# Patient Record
Sex: FEMALE | HISPANIC OR LATINO
[De-identification: names, ages, dates, MRNs, and addresses within clinical notes are randomized per-mention and may not be internally consistent; named-entity substitution may affect disease eponyms.]

---

## 2017-04-06 ENCOUNTER — RX RENEWAL (OUTPATIENT)
Age: 52
End: 2017-04-06

## 2017-04-24 ENCOUNTER — APPOINTMENT (OUTPATIENT)
Dept: SURGERY | Facility: CLINIC | Age: 52
End: 2017-04-24

## 2017-04-24 VITALS
TEMPERATURE: 97.8 F | HEART RATE: 93 BPM | DIASTOLIC BLOOD PRESSURE: 69 MMHG | OXYGEN SATURATION: 99 % | HEIGHT: 66 IN | BODY MASS INDEX: 27.32 KG/M2 | WEIGHT: 170 LBS | SYSTOLIC BLOOD PRESSURE: 106 MMHG | RESPIRATION RATE: 16 BRPM

## 2017-04-27 ENCOUNTER — RX RENEWAL (OUTPATIENT)
Age: 52
End: 2017-04-27

## 2017-05-01 ENCOUNTER — APPOINTMENT (OUTPATIENT)
Dept: SURGERY | Facility: CLINIC | Age: 52
End: 2017-05-01

## 2017-05-01 VITALS
OXYGEN SATURATION: 100 % | HEART RATE: 96 BPM | SYSTOLIC BLOOD PRESSURE: 107 MMHG | DIASTOLIC BLOOD PRESSURE: 70 MMHG | RESPIRATION RATE: 16 BRPM | TEMPERATURE: 98.1 F

## 2017-05-30 ENCOUNTER — APPOINTMENT (OUTPATIENT)
Dept: ORTHOPEDIC SURGERY | Facility: CLINIC | Age: 52
End: 2017-05-30

## 2017-05-30 VITALS
BODY MASS INDEX: 28.12 KG/M2 | SYSTOLIC BLOOD PRESSURE: 115 MMHG | HEIGHT: 66 IN | HEART RATE: 65 BPM | DIASTOLIC BLOOD PRESSURE: 73 MMHG | WEIGHT: 175 LBS

## 2017-05-30 DIAGNOSIS — Z78.9 OTHER SPECIFIED HEALTH STATUS: ICD-10-CM

## 2017-05-31 PROBLEM — Z78.9 DOES NOT USE ILLICIT DRUGS: Status: ACTIVE | Noted: 2017-05-30

## 2017-05-31 PROBLEM — Z78.9 EXERCISES OCCASIONALLY: Status: ACTIVE | Noted: 2017-05-30

## 2017-06-05 ENCOUNTER — APPOINTMENT (OUTPATIENT)
Dept: RADIOLOGY | Facility: CLINIC | Age: 52
End: 2017-06-05

## 2017-06-05 ENCOUNTER — OUTPATIENT (OUTPATIENT)
Dept: OUTPATIENT SERVICES | Facility: HOSPITAL | Age: 52
LOS: 1 days | End: 2017-06-05
Payer: COMMERCIAL

## 2017-06-05 DIAGNOSIS — Z00.8 ENCOUNTER FOR OTHER GENERAL EXAMINATION: ICD-10-CM

## 2017-06-05 PROCEDURE — 74018 RADEX ABDOMEN 1 VIEW: CPT

## 2017-06-14 ENCOUNTER — APPOINTMENT (OUTPATIENT)
Dept: FAMILY MEDICINE | Facility: CLINIC | Age: 52
End: 2017-06-14

## 2017-06-14 VITALS
SYSTOLIC BLOOD PRESSURE: 105 MMHG | DIASTOLIC BLOOD PRESSURE: 68 MMHG | RESPIRATION RATE: 14 BRPM | HEART RATE: 80 BPM | BODY MASS INDEX: 28.12 KG/M2 | OXYGEN SATURATION: 98 % | WEIGHT: 175 LBS | HEIGHT: 66 IN

## 2017-06-26 ENCOUNTER — OUTPATIENT (OUTPATIENT)
Dept: OUTPATIENT SERVICES | Facility: HOSPITAL | Age: 52
LOS: 1 days | End: 2017-06-26
Payer: COMMERCIAL

## 2017-06-26 VITALS
SYSTOLIC BLOOD PRESSURE: 98 MMHG | OXYGEN SATURATION: 99 % | HEIGHT: 66 IN | RESPIRATION RATE: 18 BRPM | HEART RATE: 60 BPM | DIASTOLIC BLOOD PRESSURE: 70 MMHG | WEIGHT: 177.91 LBS | TEMPERATURE: 97 F

## 2017-06-26 DIAGNOSIS — Z98.890 OTHER SPECIFIED POSTPROCEDURAL STATES: Chronic | ICD-10-CM

## 2017-06-26 DIAGNOSIS — M65.311 TRIGGER THUMB, RIGHT THUMB: ICD-10-CM

## 2017-06-26 DIAGNOSIS — G56.00 CARPAL TUNNEL SYNDROME, UNSPECIFIED UPPER LIMB: ICD-10-CM

## 2017-06-26 DIAGNOSIS — M18.11 UNILATERAL PRIMARY OSTEOARTHRITIS OF FIRST CARPOMETACARPAL JOINT, RIGHT HAND: ICD-10-CM

## 2017-06-26 DIAGNOSIS — Z01.818 ENCOUNTER FOR OTHER PREPROCEDURAL EXAMINATION: ICD-10-CM

## 2017-06-26 DIAGNOSIS — G56.01 CARPAL TUNNEL SYNDROME, RIGHT UPPER LIMB: ICD-10-CM

## 2017-06-26 DIAGNOSIS — Z90.49 ACQUIRED ABSENCE OF OTHER SPECIFIED PARTS OF DIGESTIVE TRACT: Chronic | ICD-10-CM

## 2017-06-26 LAB
HCT VFR BLD CALC: 36.2 % — SIGNIFICANT CHANGE UP (ref 34.5–45)
HGB BLD-MCNC: 11.9 G/DL — SIGNIFICANT CHANGE UP (ref 11.5–15.5)
MCHC RBC-ENTMCNC: 28.7 PG — SIGNIFICANT CHANGE UP (ref 27–34)
MCHC RBC-ENTMCNC: 32.9 GM/DL — SIGNIFICANT CHANGE UP (ref 32–36)
MCV RBC AUTO: 87.4 FL — SIGNIFICANT CHANGE UP (ref 80–100)
PLATELET # BLD AUTO: 249 K/UL — SIGNIFICANT CHANGE UP (ref 150–400)
RBC # BLD: 4.14 M/UL — SIGNIFICANT CHANGE UP (ref 3.8–5.2)
RBC # FLD: 13.7 % — SIGNIFICANT CHANGE UP (ref 10.3–14.5)
WBC # BLD: 5.31 K/UL — SIGNIFICANT CHANGE UP (ref 3.8–10.5)
WBC # FLD AUTO: 5.31 K/UL — SIGNIFICANT CHANGE UP (ref 3.8–10.5)

## 2017-06-26 PROCEDURE — G0463: CPT

## 2017-06-26 PROCEDURE — 85027 COMPLETE CBC AUTOMATED: CPT

## 2017-06-26 RX ORDER — ACETAMINOPHEN 500 MG
975 TABLET ORAL ONCE
Qty: 0 | Refills: 0 | Status: COMPLETED | OUTPATIENT
Start: 2017-07-10 | End: 2017-07-10

## 2017-06-26 RX ORDER — SODIUM CHLORIDE 9 MG/ML
3 INJECTION INTRAMUSCULAR; INTRAVENOUS; SUBCUTANEOUS EVERY 8 HOURS
Qty: 0 | Refills: 0 | Status: DISCONTINUED | OUTPATIENT
Start: 2017-07-10 | End: 2017-07-25

## 2017-06-26 RX ORDER — LIDOCAINE HCL 20 MG/ML
0.2 VIAL (ML) INJECTION ONCE
Qty: 0 | Refills: 0 | Status: DISCONTINUED | OUTPATIENT
Start: 2017-07-10 | End: 2017-07-25

## 2017-06-26 RX ORDER — CELECOXIB 200 MG/1
200 CAPSULE ORAL ONCE
Qty: 0 | Refills: 0 | Status: COMPLETED | OUTPATIENT
Start: 2017-07-10 | End: 2017-07-10

## 2017-06-26 NOTE — H&P PST ADULT - PROBLEM SELECTOR PLAN 1
Right carpal tunnel release, cortisone injection right basal joint, injection right thumb flexor sheath

## 2017-06-26 NOTE — H&P PST ADULT - ATTENDING COMMENTS
The patient has symptomatic right carpal tunnel syndrome, with positive NCS/EMG. Patient was offered non operative treatment, and declined. Patient is insistent that she wants to have surgery for her right carpal tunnel syndrome. The patient also has spinal stenosis, and it is not clear if the stenosis may be causing some degree of her symptoms. Even so, the patient is unwilling to pursue other evaluations or treatments and requests right carpal tunnel surgery. Given these factors and that the patient provides history that she has had virtually constant numbness in the index, long and ring fingers for three years, the patient is indicated for surgery. Because of the duration of numbness, the prognosis is limited.    Unrelated to the right carpal tunnel syndrome, the patient had left carpal tunnel release surgery. She has marked tenderness over the palmar cutaneous branch of median nerve in the distal forearm and palm. 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    Surgery for right carpal tunnel syndrome is indicated because of chronic symptoms refractory to conservative treatment, interfering with sleep, and activities of daily living. Because of the duration and severity of carpal tunnel syndrome, ongoing symptoms can be expected postoperatively. While the patient may see improvement, there is no assurance of this. The possibility of little improvement or of no improvement exists. Even though it is low, the possibility of worsening exists as well. A lengthy and detailed discussion has been held with the patient. The surgical plan, alternative treatments, and the associated risks, complications, limitations, and expectations have been discussed with the patient. Postoperative plan, need for exercising to regain motion and function, wound care, dressing care, activities, follow up, and possible need for hand therapy have been reviewed and discussed. In addition, the expectation of postop wound related pain, wound induration, swelling, weakness of , alteration of hand and finger use and function, and palmar and forearm tenderness were reviewed. In particular, the expectation of weakness, difficulty with daily activities, and wound induration often lasting six months have been stressed. All questions have been answered. The patient has expressed understanding and acceptance of the above, and has consented to surgery.

## 2017-06-26 NOTE — H&P PST ADULT - HISTORY OF PRESENT ILLNESS
52 y/o F PMH anemia due to hemorrhoids, c/o right hand numbness for the past 3 years and pain in the right thumb.  Presents today for right carpal tunnel release, cortisone injection right basal joint, injection right thumb flexor sheath.

## 2017-06-26 NOTE — H&P PST ADULT - PMH
Carpal tunnel syndrome    H. pylori infection    Hemorrhoids, internal    Hypothyroid    Rheumatoid arthritis

## 2017-06-27 ENCOUNTER — RX RENEWAL (OUTPATIENT)
Age: 52
End: 2017-06-27

## 2017-07-03 ENCOUNTER — APPOINTMENT (OUTPATIENT)
Dept: FAMILY MEDICINE | Facility: CLINIC | Age: 52
End: 2017-07-03

## 2017-07-03 VITALS
WEIGHT: 175 LBS | RESPIRATION RATE: 14 BRPM | BODY MASS INDEX: 28.12 KG/M2 | HEART RATE: 69 BPM | HEIGHT: 66 IN | SYSTOLIC BLOOD PRESSURE: 101 MMHG | DIASTOLIC BLOOD PRESSURE: 66 MMHG | OXYGEN SATURATION: 99 %

## 2017-07-03 VITALS — TEMPERATURE: 97.6 F

## 2017-07-03 DIAGNOSIS — Z11.1 ENCOUNTER FOR SCREENING FOR RESPIRATORY TUBERCULOSIS: ICD-10-CM

## 2017-07-03 DIAGNOSIS — E22.9 HYPERFUNCTION OF PITUITARY GLAND, UNSPECIFIED: ICD-10-CM

## 2017-07-03 DIAGNOSIS — Z86.19 PERSONAL HISTORY OF OTHER INFECTIOUS AND PARASITIC DISEASES: ICD-10-CM

## 2017-07-03 DIAGNOSIS — Z87.19 PERSONAL HISTORY OF OTHER DISEASES OF THE DIGESTIVE SYSTEM: ICD-10-CM

## 2017-07-03 LAB
25(OH)D3 SERPL-MCNC: 33 NG/ML
ALBUMIN SERPL ELPH-MCNC: 4.5 G/DL
ALP BLD-CCNC: 91 U/L
ALT SERPL-CCNC: 24 U/L
ANION GAP SERPL CALC-SCNC: 17 MMOL/L
AST SERPL-CCNC: 24 U/L
BASOPHILS # BLD AUTO: 0.01 K/UL
BASOPHILS NFR BLD AUTO: 0.2 %
BILIRUB SERPL-MCNC: 0.2 MG/DL
BUN SERPL-MCNC: 15 MG/DL
CALCIUM SERPL-MCNC: 10.1 MG/DL
CHLORIDE SERPL-SCNC: 103 MMOL/L
CHOLEST SERPL-MCNC: 256 MG/DL
CHOLEST/HDLC SERPL: 4.9 RATIO
CO2 SERPL-SCNC: 22 MMOL/L
CREAT SERPL-MCNC: 0.75 MG/DL
EOSINOPHIL # BLD AUTO: 0.17 K/UL
EOSINOPHIL NFR BLD AUTO: 3.3 %
GLUCOSE SERPL-MCNC: 93 MG/DL
HBA1C MFR BLD HPLC: 5.7 %
HCT VFR BLD CALC: 38.8 %
HDLC SERPL-MCNC: 52 MG/DL
HGB BLD-MCNC: 12.9 G/DL
IMM GRANULOCYTES NFR BLD AUTO: 0.2 %
IRON SATN MFR SERPL: 30 %
IRON SERPL-MCNC: 111 UG/DL
LDLC SERPL CALC-MCNC: 166 MG/DL
LYMPHOCYTES # BLD AUTO: 2.53 K/UL
LYMPHOCYTES NFR BLD AUTO: 48.5 %
MAN DIFF?: NORMAL
MCHC RBC-ENTMCNC: 29 PG
MCHC RBC-ENTMCNC: 33.2 GM/DL
MCV RBC AUTO: 87.2 FL
MONOCYTES # BLD AUTO: 0.25 K/UL
MONOCYTES NFR BLD AUTO: 4.8 %
NEUTROPHILS # BLD AUTO: 2.25 K/UL
NEUTROPHILS NFR BLD AUTO: 43 %
PLATELET # BLD AUTO: 232 K/UL
POTASSIUM SERPL-SCNC: 4.1 MMOL/L
PROT SERPL-MCNC: 7.8 G/DL
RBC # BLD: 4.45 M/UL
RBC # FLD: 13.7 %
SODIUM SERPL-SCNC: 142 MMOL/L
T4 FREE SERPL-MCNC: 1.1 NG/DL
TIBC SERPL-MCNC: 371 UG/DL
TRIGL SERPL-MCNC: 190 MG/DL
TSH SERPL-ACNC: 2.65 UIU/ML
UIBC SERPL-MCNC: 260 UG/DL
WBC # FLD AUTO: 5.22 K/UL

## 2017-07-03 RX ORDER — HYDROCORTISONE ACETATE 30 MG/1
30 SUPPOSITORY RECTAL
Qty: 28 | Refills: 0 | Status: COMPLETED | COMMUNITY
Start: 2017-04-07

## 2017-07-03 RX ORDER — AMOXICILLIN 500 MG/1
500 CAPSULE ORAL
Refills: 0 | Status: COMPLETED | COMMUNITY
Start: 2017-06-14 | End: 2017-07-03

## 2017-07-03 RX ORDER — OMEPRAZOLE, CLARITHROMYCIN, AMOXICILLIN 20(20)-500
500-500-20 KIT ORAL
Qty: 80 | Refills: 0 | Status: COMPLETED | COMMUNITY
Start: 2017-06-09

## 2017-07-03 RX ORDER — CLARITHROMYCIN 500 MG/1
500 TABLET, FILM COATED ORAL
Refills: 0 | Status: COMPLETED | COMMUNITY
Start: 2017-06-14 | End: 2017-07-03

## 2017-07-10 ENCOUNTER — APPOINTMENT (OUTPATIENT)
Dept: ORTHOPEDIC SURGERY | Facility: HOSPITAL | Age: 52
End: 2017-07-10

## 2017-07-10 ENCOUNTER — OUTPATIENT (OUTPATIENT)
Dept: OUTPATIENT SERVICES | Facility: HOSPITAL | Age: 52
LOS: 1 days | End: 2017-07-10
Payer: COMMERCIAL

## 2017-07-10 ENCOUNTER — TRANSCRIPTION ENCOUNTER (OUTPATIENT)
Age: 52
End: 2017-07-10

## 2017-07-10 VITALS
SYSTOLIC BLOOD PRESSURE: 112 MMHG | TEMPERATURE: 98 F | DIASTOLIC BLOOD PRESSURE: 68 MMHG | OXYGEN SATURATION: 98 % | HEART RATE: 64 BPM | RESPIRATION RATE: 16 BRPM

## 2017-07-10 VITALS
TEMPERATURE: 97 F | OXYGEN SATURATION: 99 % | HEIGHT: 66 IN | HEART RATE: 60 BPM | RESPIRATION RATE: 18 BRPM | DIASTOLIC BLOOD PRESSURE: 70 MMHG | SYSTOLIC BLOOD PRESSURE: 102 MMHG | WEIGHT: 177.91 LBS

## 2017-07-10 DIAGNOSIS — M18.11 UNILATERAL PRIMARY OSTEOARTHRITIS OF FIRST CARPOMETACARPAL JOINT, RIGHT HAND: ICD-10-CM

## 2017-07-10 DIAGNOSIS — M65.311 TRIGGER THUMB, RIGHT THUMB: ICD-10-CM

## 2017-07-10 DIAGNOSIS — Z98.890 OTHER SPECIFIED POSTPROCEDURAL STATES: Chronic | ICD-10-CM

## 2017-07-10 DIAGNOSIS — G56.01 CARPAL TUNNEL SYNDROME, RIGHT UPPER LIMB: ICD-10-CM

## 2017-07-10 DIAGNOSIS — Z90.49 ACQUIRED ABSENCE OF OTHER SPECIFIED PARTS OF DIGESTIVE TRACT: Chronic | ICD-10-CM

## 2017-07-10 PROCEDURE — 20600 DRAIN/INJ JOINT/BURSA W/O US: CPT | Mod: F5

## 2017-07-10 PROCEDURE — 64721 CARPAL TUNNEL SURGERY: CPT | Mod: RT

## 2017-07-10 PROCEDURE — 20550 NJX 1 TENDON SHEATH/LIGAMENT: CPT | Mod: F5,XS

## 2017-07-10 PROCEDURE — 20550 NJX 1 TENDON SHEATH/LIGAMENT: CPT | Mod: 59,F5

## 2017-07-10 PROCEDURE — 20600 DRAIN/INJ JOINT/BURSA W/O US: CPT | Mod: 59,F5

## 2017-07-10 RX ORDER — SODIUM CHLORIDE 9 MG/ML
1000 INJECTION, SOLUTION INTRAVENOUS
Qty: 0 | Refills: 0 | Status: DISCONTINUED | OUTPATIENT
Start: 2017-07-10 | End: 2017-07-25

## 2017-07-10 RX ORDER — CEFAZOLIN SODIUM 1 G
2000 VIAL (EA) INJECTION ONCE
Qty: 0 | Refills: 0 | Status: COMPLETED | OUTPATIENT
Start: 2017-07-10 | End: 2017-07-10

## 2017-07-10 RX ORDER — CELECOXIB 200 MG/1
200 CAPSULE ORAL ONCE
Qty: 0 | Refills: 0 | Status: DISCONTINUED | OUTPATIENT
Start: 2017-07-10 | End: 2017-07-25

## 2017-07-10 RX ORDER — HYDROCORTISONE 1 %
1 OINTMENT (GRAM) TOPICAL
Qty: 0 | Refills: 0 | COMMUNITY

## 2017-07-10 RX ORDER — ONDANSETRON 8 MG/1
4 TABLET, FILM COATED ORAL ONCE
Qty: 0 | Refills: 0 | Status: DISCONTINUED | OUTPATIENT
Start: 2017-07-10 | End: 2017-07-25

## 2017-07-10 RX ADMIN — CELECOXIB 200 MILLIGRAM(S): 200 CAPSULE ORAL at 07:22

## 2017-07-10 RX ADMIN — Medication 975 MILLIGRAM(S): at 07:22

## 2017-07-10 NOTE — BRIEF OPERATIVE NOTE - OPERATION/FINDINGS
Dx: R CTS  Sx: R CTR, thumb flexor sheath and basal joint steroid injection  Findings: Nothing unusual

## 2017-07-10 NOTE — ASU DISCHARGE PLAN (ADULT/PEDIATRIC). - MEDICATION SUMMARY - MEDICATIONS TO TAKE
I will START or STAY ON the medications listed below when I get home from the hospital:    Lactaid  -- 1 tab(s) by mouth , As Needed  -- Indication: For home med    Metamucil  -- 1 dose(s) by mouth , As Needed  -- Indication: For home med    ferrous sulfate  -- 1 tab(s) by mouth once a day  -- Indication: For home med    Pepcid 20 mg oral tablet  --  by mouth once the night before and the morning of surgery  -- Indication: For home med    Synthroid 150 mcg (0.15 mg) oral tablet  -- 1 tab(s) by mouth once a day  -- Indication: For home med

## 2017-07-12 ENCOUNTER — RX RENEWAL (OUTPATIENT)
Age: 52
End: 2017-07-12

## 2017-07-13 ENCOUNTER — FORM ENCOUNTER (OUTPATIENT)
Age: 52
End: 2017-07-13

## 2017-07-13 ENCOUNTER — APPOINTMENT (OUTPATIENT)
Dept: VASCULAR SURGERY | Facility: CLINIC | Age: 52
End: 2017-07-13

## 2017-07-13 VITALS
WEIGHT: 175 LBS | HEART RATE: 67 BPM | BODY MASS INDEX: 28.12 KG/M2 | TEMPERATURE: 98.1 F | SYSTOLIC BLOOD PRESSURE: 113 MMHG | DIASTOLIC BLOOD PRESSURE: 79 MMHG | HEIGHT: 66 IN

## 2017-07-13 VITALS — DIASTOLIC BLOOD PRESSURE: 68 MMHG | HEART RATE: 67 BPM | SYSTOLIC BLOOD PRESSURE: 110 MMHG

## 2017-07-13 DIAGNOSIS — I83.90 ASYMPTOMATIC VARICOSE VEINS OF UNSPECIFIED LOWER EXTREMITY: ICD-10-CM

## 2017-07-14 ENCOUNTER — APPOINTMENT (OUTPATIENT)
Dept: ULTRASOUND IMAGING | Facility: CLINIC | Age: 52
End: 2017-07-14

## 2017-07-14 ENCOUNTER — OUTPATIENT (OUTPATIENT)
Dept: OUTPATIENT SERVICES | Facility: HOSPITAL | Age: 52
LOS: 1 days | End: 2017-07-14
Payer: COMMERCIAL

## 2017-07-14 ENCOUNTER — APPOINTMENT (OUTPATIENT)
Dept: MAMMOGRAPHY | Facility: CLINIC | Age: 52
End: 2017-07-14

## 2017-07-14 DIAGNOSIS — Z98.890 OTHER SPECIFIED POSTPROCEDURAL STATES: Chronic | ICD-10-CM

## 2017-07-14 DIAGNOSIS — R22.1 LOCALIZED SWELLING, MASS AND LUMP, NECK: ICD-10-CM

## 2017-07-14 DIAGNOSIS — Z90.49 ACQUIRED ABSENCE OF OTHER SPECIFIED PARTS OF DIGESTIVE TRACT: Chronic | ICD-10-CM

## 2017-07-14 DIAGNOSIS — Z00.00 ENCOUNTER FOR GENERAL ADULT MEDICAL EXAMINATION WITHOUT ABNORMAL FINDINGS: ICD-10-CM

## 2017-07-14 PROCEDURE — 76536 US EXAM OF HEAD AND NECK: CPT

## 2017-07-14 PROCEDURE — 77063 BREAST TOMOSYNTHESIS BI: CPT

## 2017-07-14 PROCEDURE — 76641 ULTRASOUND BREAST COMPLETE: CPT

## 2017-07-14 PROCEDURE — 77067 SCR MAMMO BI INCL CAD: CPT

## 2017-07-17 ENCOUNTER — APPOINTMENT (OUTPATIENT)
Dept: ORTHOPEDIC SURGERY | Facility: CLINIC | Age: 52
End: 2017-07-17

## 2017-07-18 ENCOUNTER — APPOINTMENT (OUTPATIENT)
Dept: FAMILY MEDICINE | Facility: CLINIC | Age: 52
End: 2017-07-18

## 2017-07-18 VITALS
SYSTOLIC BLOOD PRESSURE: 111 MMHG | HEIGHT: 66 IN | TEMPERATURE: 97.6 F | HEART RATE: 64 BPM | DIASTOLIC BLOOD PRESSURE: 75 MMHG | WEIGHT: 176 LBS | BODY MASS INDEX: 28.28 KG/M2

## 2017-07-18 RX ORDER — FERROUS SULFATE 325(65) MG
325 (65 FE) TABLET ORAL
Refills: 0 | Status: DISCONTINUED | COMMUNITY
End: 2017-07-18

## 2017-07-18 RX ORDER — OMEPRAZOLE 20 MG/1
20 CAPSULE, DELAYED RELEASE ORAL
Refills: 0 | Status: DISCONTINUED | COMMUNITY
Start: 2017-06-14 | End: 2017-07-18

## 2017-07-18 RX ORDER — GABAPENTIN ENACARBIL 300 MG/1
300 TABLET, EXTENDED RELEASE ORAL
Refills: 0 | Status: DISCONTINUED | COMMUNITY
End: 2017-07-18

## 2017-07-18 RX ORDER — CHLORHEXIDINE GLUCONATE 4 %
325 (65 FE) LIQUID (ML) TOPICAL 3 TIMES DAILY
Qty: 90 | Refills: 0 | Status: DISCONTINUED | COMMUNITY
Start: 2017-06-14 | End: 2017-07-18

## 2017-07-21 DIAGNOSIS — E04.1 NONTOXIC SINGLE THYROID NODULE: ICD-10-CM

## 2017-07-25 LAB
AMPHET UR-MCNC: NEGATIVE
BARBITURATES UR-MCNC: NEGATIVE
BENZODIAZ UR-MCNC: NEGATIVE
COCAINE METAB.OTHER UR-MCNC: NEGATIVE
CREATININE, URINE: 56.9 MG/DL
METHADONE UR-MCNC: NEGATIVE
METHAQUALONE UR-MCNC: NEGATIVE
OPIATES UR-MCNC: NEGATIVE
PCP UR-MCNC: NEGATIVE
PROPOXYPH UR QL: NEGATIVE
THC UR QL: NEGATIVE

## 2017-09-05 ENCOUNTER — OUTPATIENT (OUTPATIENT)
Dept: OUTPATIENT SERVICES | Facility: HOSPITAL | Age: 52
LOS: 1 days | End: 2017-09-05
Payer: COMMERCIAL

## 2017-09-05 ENCOUNTER — APPOINTMENT (OUTPATIENT)
Dept: ULTRASOUND IMAGING | Facility: CLINIC | Age: 52
End: 2017-09-05

## 2017-09-05 DIAGNOSIS — Z00.8 ENCOUNTER FOR OTHER GENERAL EXAMINATION: ICD-10-CM

## 2017-09-05 DIAGNOSIS — Z90.49 ACQUIRED ABSENCE OF OTHER SPECIFIED PARTS OF DIGESTIVE TRACT: Chronic | ICD-10-CM

## 2017-09-05 DIAGNOSIS — Z98.890 OTHER SPECIFIED POSTPROCEDURAL STATES: Chronic | ICD-10-CM

## 2017-09-05 PROCEDURE — 76700 US EXAM ABDOM COMPLETE: CPT | Mod: 26

## 2017-09-05 PROCEDURE — 76700 US EXAM ABDOM COMPLETE: CPT

## 2017-09-15 ENCOUNTER — APPOINTMENT (OUTPATIENT)
Dept: MRI IMAGING | Facility: CLINIC | Age: 52
End: 2017-09-15

## 2017-09-15 ENCOUNTER — OUTPATIENT (OUTPATIENT)
Dept: OUTPATIENT SERVICES | Facility: HOSPITAL | Age: 52
LOS: 1 days | End: 2017-09-15
Payer: COMMERCIAL

## 2017-09-15 DIAGNOSIS — Z98.890 OTHER SPECIFIED POSTPROCEDURAL STATES: Chronic | ICD-10-CM

## 2017-09-15 DIAGNOSIS — Z90.49 ACQUIRED ABSENCE OF OTHER SPECIFIED PARTS OF DIGESTIVE TRACT: Chronic | ICD-10-CM

## 2017-09-15 DIAGNOSIS — Z00.8 ENCOUNTER FOR OTHER GENERAL EXAMINATION: ICD-10-CM

## 2017-09-15 PROCEDURE — 74183 MRI ABD W/O CNTR FLWD CNTR: CPT | Mod: 26

## 2017-09-15 PROCEDURE — 74183 MRI ABD W/O CNTR FLWD CNTR: CPT

## 2017-09-15 PROCEDURE — A9585: CPT

## 2017-10-10 ENCOUNTER — RX RENEWAL (OUTPATIENT)
Age: 52
End: 2017-10-10

## 2017-10-24 ENCOUNTER — RX RENEWAL (OUTPATIENT)
Age: 52
End: 2017-10-24

## 2017-12-19 ENCOUNTER — APPOINTMENT (OUTPATIENT)
Dept: ORTHOPEDIC SURGERY | Facility: CLINIC | Age: 52
End: 2017-12-19
Payer: COMMERCIAL

## 2017-12-19 DIAGNOSIS — M18.11 UNILATERAL PRIMARY OSTEOARTHRITIS OF FIRST CARPOMETACARPAL JOINT, RIGHT HAND: ICD-10-CM

## 2017-12-19 PROCEDURE — 99214 OFFICE O/P EST MOD 30 MIN: CPT

## 2017-12-20 PROBLEM — M18.11 PRIMARY OSTEOARTHRITIS OF FIRST CARPOMETACARPAL JOINT OF RIGHT HAND: Status: ACTIVE | Noted: 2017-05-30

## 2017-12-27 ENCOUNTER — APPOINTMENT (OUTPATIENT)
Dept: FAMILY MEDICINE | Facility: CLINIC | Age: 52
End: 2017-12-27
Payer: COMMERCIAL

## 2017-12-27 ENCOUNTER — APPOINTMENT (OUTPATIENT)
Dept: ORTHOPEDIC SURGERY | Facility: HOSPITAL | Age: 52
End: 2017-12-27

## 2017-12-27 VITALS
RESPIRATION RATE: 13 BRPM | OXYGEN SATURATION: 97 % | DIASTOLIC BLOOD PRESSURE: 74 MMHG | TEMPERATURE: 97.8 F | HEART RATE: 71 BPM | SYSTOLIC BLOOD PRESSURE: 110 MMHG

## 2017-12-27 DIAGNOSIS — Z87.898 PERSONAL HISTORY OF OTHER SPECIFIED CONDITIONS: ICD-10-CM

## 2017-12-27 DIAGNOSIS — Z01.818 ENCOUNTER FOR OTHER PREPROCEDURAL EXAMINATION: ICD-10-CM

## 2017-12-27 PROCEDURE — 99214 OFFICE O/P EST MOD 30 MIN: CPT

## 2018-01-11 ENCOUNTER — OUTPATIENT (OUTPATIENT)
Dept: OUTPATIENT SERVICES | Facility: HOSPITAL | Age: 53
LOS: 1 days | End: 2018-01-11
Payer: COMMERCIAL

## 2018-01-11 VITALS
SYSTOLIC BLOOD PRESSURE: 120 MMHG | TEMPERATURE: 98 F | HEART RATE: 79 BPM | HEIGHT: 66 IN | DIASTOLIC BLOOD PRESSURE: 78 MMHG | OXYGEN SATURATION: 100 % | RESPIRATION RATE: 20 BRPM | WEIGHT: 177.91 LBS

## 2018-01-11 DIAGNOSIS — Z98.890 OTHER SPECIFIED POSTPROCEDURAL STATES: Chronic | ICD-10-CM

## 2018-01-11 DIAGNOSIS — M65.311 TRIGGER THUMB, RIGHT THUMB: ICD-10-CM

## 2018-01-11 DIAGNOSIS — Z90.49 ACQUIRED ABSENCE OF OTHER SPECIFIED PARTS OF DIGESTIVE TRACT: Chronic | ICD-10-CM

## 2018-01-11 DIAGNOSIS — Z01.818 ENCOUNTER FOR OTHER PREPROCEDURAL EXAMINATION: ICD-10-CM

## 2018-01-11 PROCEDURE — G0463: CPT

## 2018-01-11 PROCEDURE — 85027 COMPLETE CBC AUTOMATED: CPT

## 2018-01-11 RX ORDER — LEVOTHYROXINE SODIUM 125 MCG
1 TABLET ORAL
Qty: 0 | Refills: 0 | COMMUNITY

## 2018-01-11 RX ORDER — SODIUM CHLORIDE 9 MG/ML
3 INJECTION INTRAMUSCULAR; INTRAVENOUS; SUBCUTANEOUS EVERY 8 HOURS
Qty: 0 | Refills: 0 | Status: DISCONTINUED | OUTPATIENT
Start: 2018-01-17 | End: 2018-02-01

## 2018-01-11 RX ORDER — ACETAMINOPHEN 500 MG
1000 TABLET ORAL ONCE
Qty: 0 | Refills: 0 | Status: COMPLETED | OUTPATIENT
Start: 2018-01-17 | End: 2018-01-17

## 2018-01-11 RX ORDER — PSYLLIUM SEED (WITH DEXTROSE)
1 POWDER (GRAM) ORAL
Qty: 0 | Refills: 0 | COMMUNITY

## 2018-01-11 RX ORDER — LIDOCAINE HCL 20 MG/ML
0.2 VIAL (ML) INJECTION ONCE
Qty: 0 | Refills: 0 | Status: DISCONTINUED | OUTPATIENT
Start: 2018-01-17 | End: 2018-02-01

## 2018-01-11 RX ORDER — B-COMPLEX WITH VITAMIN C
1 CAPSULE ORAL
Qty: 0 | Refills: 0 | COMMUNITY

## 2018-01-11 RX ORDER — FAMOTIDINE 10 MG/ML
0 INJECTION INTRAVENOUS
Qty: 0 | Refills: 0 | COMMUNITY

## 2018-01-11 RX ORDER — FERROUS SULFATE 325(65) MG
1 TABLET ORAL
Qty: 0 | Refills: 0 | COMMUNITY

## 2018-01-11 NOTE — H&P PST ADULT - ATTENDING COMMENTS
The patient has recurrent trigger finger, right thumb following cortisone injection that is painful, disabling, and locked in flexion. FPL and EPL function are intact. Thumb IP joint  range of motion is limited because of pain. Given the severe amount of symptoms, future cortisone injection is unlikely to permanently resolve symptoms.  Because patient had a good result after left thumb trigger release surgery, the patient requests right thumb trigger release surgery, which is indicated.  Surgery for right thumb trigger finger is indicated because of symptoms refractory to conservative treatment. The patient has pain and interference with activities of daily living. While the patient may see improvement, the patient may not have complete range of motion or complete return to activities of daily living. Postoperative hand therapy, and other treatment modalities may be required The range of treatment alternatives, and the associated risks complications limitations and expectations were explained and discussed. Incomplete range of motion, stiffness, pain, palmar fibromatosis are a just few of many factors reviewed and discussed with the patient. All questions have been answered. The patient has expressed acceptance and understanding of the above and has consented to surgery. The patient has recurrent trigger finger, right thumb following cortisone injection that is painful, disabling, and locked in flexion. FPL and EPL function are intact. Thumb IP joint  range of motion is limited because of pain. Given the severe amount of symptoms, future cortisone injection is unlikely to permanently resolve symptoms.  Because patient had a good result after left thumb trigger release surgery, the patient requests right thumb trigger release surgery, which is indicated.  Patient has painful triggering right long finger not previously treated. A cortisone injection is indicated and will be performed. Pt agrees and accepts this plan.  Surgery for right thumb trigger finger is indicated because of symptoms refractory to conservative treatment. The patient has pain and interference with activities of daily living. While the patient may see improvement, the patient may not have complete range of motion or complete return to activities of daily living. Postoperative hand therapy, and other treatment modalities may be required The range of treatment alternatives, and the associated risks complications limitations and expectations were explained and discussed. Incomplete range of motion, stiffness, pain, palmar fibromatosis are a just few of many factors reviewed and discussed with the patient. All questions have been answered. The patient has expressed acceptance and understanding of the above and has consented to surgery.

## 2018-01-11 NOTE — H&P PST ADULT - PSH
S/P appendectomy    S/P carpal tunnel release  right 7/2017  S/P carpal tunnel release  left 2016, trigger finger, 2016  S/P hernia repair  umbilical

## 2018-01-11 NOTE — H&P PST ADULT - HISTORY OF PRESENT ILLNESS
52 y/o F PMH anemia due to hemorrhoids, c/o right hand numbness for the past 3 years and pain in the right thumb- s/p  right carpal tunnel release, cortisone injection right basal joint, injection right thumb flexor sheath 6/2017, Now coming in for Right thumb trigger release on 1/17/18.

## 2018-01-11 NOTE — H&P PST ADULT - PMH
Carpal tunnel syndrome    H. pylori infection    Hemorrhoids, internal    Hyperlipidemia    Hypothyroid    Rheumatoid arthritis    Trigger finger of right thumb

## 2018-01-11 NOTE — H&P PST ADULT - RS GEN PE MLT RESP DETAILS PC
good air movement/breath sounds equal/clear to auscultation bilaterally/respirations non-labored/airway patent/normal

## 2018-01-12 LAB
HCT VFR BLD CALC: 37.6 % — SIGNIFICANT CHANGE UP (ref 34.5–45)
HGB BLD-MCNC: 12.5 G/DL — SIGNIFICANT CHANGE UP (ref 11.5–15.5)
MCHC RBC-ENTMCNC: 29.3 PG — SIGNIFICANT CHANGE UP (ref 27–34)
MCHC RBC-ENTMCNC: 33.2 GM/DL — SIGNIFICANT CHANGE UP (ref 32–36)
MCV RBC AUTO: 88.1 FL — SIGNIFICANT CHANGE UP (ref 80–100)
PLATELET # BLD AUTO: 234 K/UL — SIGNIFICANT CHANGE UP (ref 150–400)
RBC # BLD: 4.27 M/UL — SIGNIFICANT CHANGE UP (ref 3.8–5.2)
RBC # FLD: 13.7 % — SIGNIFICANT CHANGE UP (ref 10.3–14.5)
WBC # BLD: 7.11 K/UL — SIGNIFICANT CHANGE UP (ref 3.8–10.5)
WBC # FLD AUTO: 7.11 K/UL — SIGNIFICANT CHANGE UP (ref 3.8–10.5)

## 2018-01-16 RX ORDER — CELECOXIB 200 MG/1
200 CAPSULE ORAL ONCE
Qty: 0 | Refills: 0 | Status: DISCONTINUED | OUTPATIENT
Start: 2018-01-17 | End: 2018-02-01

## 2018-01-16 RX ORDER — ONDANSETRON 8 MG/1
4 TABLET, FILM COATED ORAL ONCE
Qty: 0 | Refills: 0 | Status: DISCONTINUED | OUTPATIENT
Start: 2018-01-17 | End: 2018-02-01

## 2018-01-16 RX ORDER — SODIUM CHLORIDE 9 MG/ML
1000 INJECTION, SOLUTION INTRAVENOUS
Qty: 0 | Refills: 0 | Status: DISCONTINUED | OUTPATIENT
Start: 2018-01-17 | End: 2018-02-01

## 2018-01-17 ENCOUNTER — APPOINTMENT (OUTPATIENT)
Dept: ORTHOPEDIC SURGERY | Facility: HOSPITAL | Age: 53
End: 2018-01-17

## 2018-01-17 ENCOUNTER — OUTPATIENT (OUTPATIENT)
Dept: OUTPATIENT SERVICES | Facility: HOSPITAL | Age: 53
LOS: 1 days | End: 2018-01-17
Payer: COMMERCIAL

## 2018-01-17 VITALS
OXYGEN SATURATION: 99 % | DIASTOLIC BLOOD PRESSURE: 74 MMHG | WEIGHT: 177.91 LBS | HEIGHT: 66 IN | SYSTOLIC BLOOD PRESSURE: 102 MMHG | HEART RATE: 70 BPM | TEMPERATURE: 97 F | RESPIRATION RATE: 16 BRPM

## 2018-01-17 VITALS
TEMPERATURE: 97 F | HEART RATE: 68 BPM | OXYGEN SATURATION: 100 % | RESPIRATION RATE: 16 BRPM | SYSTOLIC BLOOD PRESSURE: 103 MMHG | DIASTOLIC BLOOD PRESSURE: 61 MMHG

## 2018-01-17 DIAGNOSIS — Z98.890 OTHER SPECIFIED POSTPROCEDURAL STATES: Chronic | ICD-10-CM

## 2018-01-17 DIAGNOSIS — Z90.49 ACQUIRED ABSENCE OF OTHER SPECIFIED PARTS OF DIGESTIVE TRACT: Chronic | ICD-10-CM

## 2018-01-17 DIAGNOSIS — M65.311 TRIGGER THUMB, RIGHT THUMB: ICD-10-CM

## 2018-01-17 PROCEDURE — 20550 NJX 1 TENDON SHEATH/LIGAMENT: CPT | Mod: F7

## 2018-01-17 PROCEDURE — 26055 INCISE FINGER TENDON SHEATH: CPT | Mod: F5

## 2018-01-17 PROCEDURE — 20550 NJX 1 TENDON SHEATH/LIGAMENT: CPT | Mod: 59,F7

## 2018-01-17 RX ORDER — CELECOXIB 200 MG/1
200 CAPSULE ORAL ONCE
Qty: 0 | Refills: 0 | Status: COMPLETED | OUTPATIENT
Start: 2018-01-17 | End: 2018-01-17

## 2018-01-17 RX ADMIN — Medication 1000 MILLIGRAM(S): at 14:01

## 2018-01-17 RX ADMIN — CELECOXIB 200 MILLIGRAM(S): 200 CAPSULE ORAL at 14:02

## 2018-01-17 NOTE — ASU DISCHARGE PLAN (ADULT/PEDIATRIC). - MEDICATION SUMMARY - MEDICATIONS TO TAKE
I will START or STAY ON the medications listed below when I get home from the hospital:    neocell 1 tab oral daily  -- Indication: For home medication    move free 1 tab oral daily  -- Indication: For home medication    atorvastatin 10 mg oral tablet  -- 1 tab(s) by mouth once a day (at bedtime)  -- Indication: For home medication    Pepcid AC Maximum Strength 20 mg oral tablet  -- 1 tab(s) by mouth 2 times a day  -- Indication: For home medication    MiraLax oral powder for reconstitution  -- 1 dose(s) by mouth once a day (at bedtime)  -- Indication: For home medication    magnesium carbonate 250 mg oral capsule  -- 1 cap(s) by mouth once a day  -- Indication: For home medication    Fish Oil 1200 mg oral capsule  -- 1 cap(s) by mouth 3 times a day  -- Indication: For home medication    Synthroid 50 mcg (0.05 mg) oral tablet  -- 1 tab(s) by mouth once a day  -- Indication: For home medication    Vitamin D3 5000 intl units oral capsule  -- 1 cap(s) by mouth once a day  -- Indication: For home medication

## 2018-01-17 NOTE — ASU DISCHARGE PLAN (ADULT/PEDIATRIC). - SPECIAL INSTRUCTIONS
Please see the instruction sheet for your post-operative care.  You may take over-the-counter pain medications as described on the sheet.

## 2018-01-18 ENCOUNTER — TRANSCRIPTION ENCOUNTER (OUTPATIENT)
Age: 53
End: 2018-01-18

## 2018-01-23 ENCOUNTER — RX RENEWAL (OUTPATIENT)
Age: 53
End: 2018-01-23

## 2018-01-24 ENCOUNTER — APPOINTMENT (OUTPATIENT)
Dept: ORTHOPEDIC SURGERY | Facility: CLINIC | Age: 53
End: 2018-01-24
Payer: COMMERCIAL

## 2018-01-24 DIAGNOSIS — M65.311 TRIGGER THUMB, RIGHT THUMB: ICD-10-CM

## 2018-01-24 PROCEDURE — 99024 POSTOP FOLLOW-UP VISIT: CPT

## 2018-04-02 ENCOUNTER — APPOINTMENT (OUTPATIENT)
Dept: FAMILY MEDICINE | Facility: CLINIC | Age: 53
End: 2018-04-02

## 2018-04-04 ENCOUNTER — APPOINTMENT (OUTPATIENT)
Dept: FAMILY MEDICINE | Facility: CLINIC | Age: 53
End: 2018-04-04
Payer: COMMERCIAL

## 2018-04-04 VITALS
OXYGEN SATURATION: 98 % | SYSTOLIC BLOOD PRESSURE: 114 MMHG | HEART RATE: 67 BPM | RESPIRATION RATE: 14 BRPM | DIASTOLIC BLOOD PRESSURE: 70 MMHG

## 2018-04-04 DIAGNOSIS — M25.561 PAIN IN RIGHT KNEE: ICD-10-CM

## 2018-04-04 PROCEDURE — 99213 OFFICE O/P EST LOW 20 MIN: CPT

## 2018-04-11 ENCOUNTER — OUTPATIENT (OUTPATIENT)
Dept: OUTPATIENT SERVICES | Facility: HOSPITAL | Age: 53
LOS: 1 days | End: 2018-04-11
Payer: COMMERCIAL

## 2018-04-11 ENCOUNTER — APPOINTMENT (OUTPATIENT)
Dept: MRI IMAGING | Facility: CLINIC | Age: 53
End: 2018-04-11

## 2018-04-11 DIAGNOSIS — Z98.890 OTHER SPECIFIED POSTPROCEDURAL STATES: Chronic | ICD-10-CM

## 2018-04-11 DIAGNOSIS — Z00.8 ENCOUNTER FOR OTHER GENERAL EXAMINATION: ICD-10-CM

## 2018-04-11 DIAGNOSIS — Z90.49 ACQUIRED ABSENCE OF OTHER SPECIFIED PARTS OF DIGESTIVE TRACT: Chronic | ICD-10-CM

## 2018-04-11 PROCEDURE — 73721 MRI JNT OF LWR EXTRE W/O DYE: CPT

## 2018-04-11 PROCEDURE — 73721 MRI JNT OF LWR EXTRE W/O DYE: CPT | Mod: 26,RT

## 2018-04-19 ENCOUNTER — RX RENEWAL (OUTPATIENT)
Age: 53
End: 2018-04-19

## 2018-04-25 ENCOUNTER — RX RENEWAL (OUTPATIENT)
Age: 53
End: 2018-04-25

## 2018-05-15 ENCOUNTER — RESULT REVIEW (OUTPATIENT)
Age: 53
End: 2018-05-15

## 2018-07-02 ENCOUNTER — APPOINTMENT (OUTPATIENT)
Dept: FAMILY MEDICINE | Facility: CLINIC | Age: 53
End: 2018-07-02
Payer: COMMERCIAL

## 2018-07-02 ENCOUNTER — RX RENEWAL (OUTPATIENT)
Age: 53
End: 2018-07-02

## 2018-07-02 VITALS
BODY MASS INDEX: 28.45 KG/M2 | WEIGHT: 177 LBS | OXYGEN SATURATION: 99 % | RESPIRATION RATE: 14 BRPM | HEART RATE: 64 BPM | TEMPERATURE: 97.3 F | SYSTOLIC BLOOD PRESSURE: 114 MMHG | HEIGHT: 66 IN | DIASTOLIC BLOOD PRESSURE: 80 MMHG

## 2018-07-02 PROCEDURE — 99396 PREV VISIT EST AGE 40-64: CPT

## 2018-07-02 PROCEDURE — 86580 TB INTRADERMAL TEST: CPT

## 2018-07-02 RX ORDER — IBUPROFEN 800 MG/1
800 TABLET, FILM COATED ORAL 3 TIMES DAILY
Qty: 30 | Refills: 0 | Status: COMPLETED | COMMUNITY
Start: 2018-04-04 | End: 2018-07-02

## 2018-07-05 NOTE — REVIEW OF SYSTEMS
[Fatigue] : fatigue [Joint Pain] : joint pain [Negative] : Heme/Lymph [FreeTextEntry8] : vaginal dryness [de-identified] : rosacea

## 2018-07-05 NOTE — HEALTH RISK ASSESSMENT
[Good] : ~his/her~ current health as good [Very Good] : ~his/her~  mood as very good [] : No [No falls in past year] : Patient reported no falls in the past year [0] : 2) Feeling down, depressed, or hopeless: Not at all (0) [[id number]] : 0 [Change in mental status noted] : No change in mental status noted [With Family] : lives with family [Employed] : employed [] :  [Sexually Active] : sexually active [High Risk Behavior] : no high risk behavior [Feels Safe at Home] : Feels safe at home [MammogramDate] : 07/17 [ColonoscopyDate] : 2 years ago

## 2018-07-05 NOTE — HISTORY OF PRESENT ILLNESS
[de-identified] : Patient presents for physical exam. She has been doing well. \par She injured her right knee at work and is undergoing physical therapy with improvement in her symptoms.\par Today she has some right neck and shoulder pain with movement. She was lifting a patient at work and noted her symptoms the following day.\par Continues to have joint pain. She is following up with rheumatology. \par

## 2018-07-05 NOTE — PHYSICAL EXAM
[No Acute Distress] : no acute distress [Well Nourished] : well nourished [Well Developed] : well developed [Well-Appearing] : well-appearing [Normal Sclera/Conjunctiva] : normal sclera/conjunctiva [PERRL] : pupils equal round and reactive to light [Normal Outer Ear/Nose] : the outer ears and nose were normal in appearance [Normal Oropharynx] : the oropharynx was normal [Normal TMs] : both tympanic membranes were normal [No JVD] : no jugular venous distention [Supple] : supple [No Lymphadenopathy] : no lymphadenopathy [No Respiratory Distress] : no respiratory distress  [Clear to Auscultation] : lungs were clear to auscultation bilaterally [No Accessory Muscle Use] : no accessory muscle use [Normal Rate] : normal rate  [Regular Rhythm] : with a regular rhythm [Normal S1, S2] : normal S1 and S2 [No Murmur] : no murmur heard [Pedal Pulses Present] : the pedal pulses are present [No Edema] : there was no peripheral edema [No Extremity Clubbing/Cyanosis] : no extremity clubbing/cyanosis [Soft] : abdomen soft [Non Tender] : non-tender [Non-distended] : non-distended [Normal Bowel Sounds] : normal bowel sounds [Normal Supraclavicular Nodes] : no supraclavicular lymphadenopathy [Normal Posterior Cervical Nodes] : no posterior cervical lymphadenopathy [Normal Anterior Cervical Nodes] : no anterior cervical lymphadenopathy [No CVA Tenderness] : no CVA  tenderness [No Spinal Tenderness] : no spinal tenderness [No Joint Swelling] : no joint swelling [Grossly Normal Strength/Tone] : grossly normal strength/tone [No Rash] : no rash [Normal Gait] : normal gait [Coordination Grossly Intact] : coordination grossly intact [No Focal Deficits] : no focal deficits [Deep Tendon Reflexes (DTR)] : deep tendon reflexes were 2+ and symmetric [Normal Affect] : the affect was normal [Normal Insight/Judgement] : insight and judgment were intact

## 2018-07-16 ENCOUNTER — RX RENEWAL (OUTPATIENT)
Age: 53
End: 2018-07-16

## 2018-07-19 LAB
25(OH)D3 SERPL-MCNC: 37.7 NG/ML
ALBUMIN SERPL ELPH-MCNC: 4.4 G/DL
ALP BLD-CCNC: 85 U/L
ALT SERPL-CCNC: 21 U/L
AMPHET UR-MCNC: NEGATIVE
ANA PAT FLD IF-IMP: ABNORMAL
ANA SER IF-ACNC: ABNORMAL
ANION GAP SERPL CALC-SCNC: 12 MMOL/L
AST SERPL-CCNC: 22 U/L
BARBITURATES UR-MCNC: NEGATIVE
BASOPHILS # BLD AUTO: 0.02 K/UL
BASOPHILS NFR BLD AUTO: 0.3 %
BENZODIAZ UR-MCNC: NEGATIVE
BILIRUB SERPL-MCNC: 0.3 MG/DL
BUN SERPL-MCNC: 8 MG/DL
CALCIUM SERPL-MCNC: 9.7 MG/DL
CHLORIDE SERPL-SCNC: 109 MMOL/L
CHOLEST SERPL-MCNC: 147 MG/DL
CHOLEST/HDLC SERPL: 2.9 RATIO
CO2 SERPL-SCNC: 23 MMOL/L
COCAINE METAB.OTHER UR-MCNC: NEGATIVE
CREAT SERPL-MCNC: 0.72 MG/DL
CREATININE, URINE: 24 MG/DL
EOSINOPHIL # BLD AUTO: 0.14 K/UL
EOSINOPHIL NFR BLD AUTO: 2.4 %
ERYTHROCYTE [SEDIMENTATION RATE] IN BLOOD BY WESTERGREN METHOD: 8 MM/HR
HCT VFR BLD CALC: 38 %
HDLC SERPL-MCNC: 51 MG/DL
HGB BLD-MCNC: 12.4 G/DL
HIV1+2 AB SPEC QL IA.RAPID: NONREACTIVE
IMM GRANULOCYTES NFR BLD AUTO: 0 %
LDLC SERPL CALC-MCNC: 67 MG/DL
LYMPHOCYTES # BLD AUTO: 2.37 K/UL
LYMPHOCYTES NFR BLD AUTO: 40.6 %
MAN DIFF?: NORMAL
MCHC RBC-ENTMCNC: 28.7 PG
MCHC RBC-ENTMCNC: 32.6 GM/DL
MCV RBC AUTO: 88 FL
METHADONE UR-MCNC: NEGATIVE
METHAQUALONE UR-MCNC: NEGATIVE
MONOCYTES # BLD AUTO: 0.29 K/UL
MONOCYTES NFR BLD AUTO: 5 %
NEUTROPHILS # BLD AUTO: 3.02 K/UL
NEUTROPHILS NFR BLD AUTO: 51.7 %
OPIATES UR-MCNC: NEGATIVE
PCP UR-MCNC: NEGATIVE
PLATELET # BLD AUTO: 252 K/UL
POTASSIUM SERPL-SCNC: 4.4 MMOL/L
PROPOXYPH UR QL: NEGATIVE
PROT SERPL-MCNC: 7.4 G/DL
RBC # BLD: 4.32 M/UL
RBC # FLD: 13.9 %
RHEUMATOID FACT SER QL: <7 IU/ML
SODIUM SERPL-SCNC: 144 MMOL/L
THC UR QL: NEGATIVE
TRIGL SERPL-MCNC: 145 MG/DL
TSH SERPL-ACNC: 1.76 UIU/ML
WBC # FLD AUTO: 5.84 K/UL

## 2018-08-23 ENCOUNTER — RX RENEWAL (OUTPATIENT)
Age: 53
End: 2018-08-23

## 2018-10-15 ENCOUNTER — RX RENEWAL (OUTPATIENT)
Age: 53
End: 2018-10-15

## 2019-01-10 ENCOUNTER — FORM ENCOUNTER (OUTPATIENT)
Age: 54
End: 2019-01-10

## 2019-01-10 DIAGNOSIS — R92.8 OTHER ABNORMAL AND INCONCLUSIVE FINDINGS ON DIAGNOSTIC IMAGING OF BREAST: ICD-10-CM

## 2019-01-10 PROBLEM — E03.9 HYPOTHYROIDISM, UNSPECIFIED: Chronic | Status: ACTIVE | Noted: 2017-06-26

## 2019-01-11 ENCOUNTER — APPOINTMENT (OUTPATIENT)
Dept: MAMMOGRAPHY | Facility: CLINIC | Age: 54
End: 2019-01-11
Payer: COMMERCIAL

## 2019-01-11 ENCOUNTER — OUTPATIENT (OUTPATIENT)
Dept: OUTPATIENT SERVICES | Facility: HOSPITAL | Age: 54
LOS: 1 days | End: 2019-01-11
Payer: COMMERCIAL

## 2019-01-11 ENCOUNTER — APPOINTMENT (OUTPATIENT)
Dept: ULTRASOUND IMAGING | Facility: CLINIC | Age: 54
End: 2019-01-11
Payer: COMMERCIAL

## 2019-01-11 DIAGNOSIS — Z00.00 ENCOUNTER FOR GENERAL ADULT MEDICAL EXAMINATION WITHOUT ABNORMAL FINDINGS: ICD-10-CM

## 2019-01-11 DIAGNOSIS — Z98.890 OTHER SPECIFIED POSTPROCEDURAL STATES: Chronic | ICD-10-CM

## 2019-01-11 DIAGNOSIS — Z90.49 ACQUIRED ABSENCE OF OTHER SPECIFIED PARTS OF DIGESTIVE TRACT: Chronic | ICD-10-CM

## 2019-01-11 PROCEDURE — 77063 BREAST TOMOSYNTHESIS BI: CPT | Mod: 26

## 2019-01-11 PROCEDURE — 76641 ULTRASOUND BREAST COMPLETE: CPT | Mod: 26,50

## 2019-01-11 PROCEDURE — 77067 SCR MAMMO BI INCL CAD: CPT

## 2019-01-11 PROCEDURE — 76641 ULTRASOUND BREAST COMPLETE: CPT

## 2019-01-11 PROCEDURE — 77067 SCR MAMMO BI INCL CAD: CPT | Mod: 26

## 2019-01-11 PROCEDURE — 77063 BREAST TOMOSYNTHESIS BI: CPT

## 2019-01-17 ENCOUNTER — MEDICATION RENEWAL (OUTPATIENT)
Age: 54
End: 2019-01-17

## 2019-01-22 PROBLEM — R92.8 ABNORMAL ULTRASOUND OF BREAST: Status: ACTIVE | Noted: 2019-01-22

## 2019-01-24 ENCOUNTER — FORM ENCOUNTER (OUTPATIENT)
Age: 54
End: 2019-01-24

## 2019-01-25 ENCOUNTER — OUTPATIENT (OUTPATIENT)
Dept: OUTPATIENT SERVICES | Facility: HOSPITAL | Age: 54
LOS: 1 days | End: 2019-01-25
Payer: COMMERCIAL

## 2019-01-25 ENCOUNTER — APPOINTMENT (OUTPATIENT)
Dept: ULTRASOUND IMAGING | Facility: CLINIC | Age: 54
End: 2019-01-25

## 2019-01-25 DIAGNOSIS — Z98.890 OTHER SPECIFIED POSTPROCEDURAL STATES: Chronic | ICD-10-CM

## 2019-01-25 DIAGNOSIS — Z00.8 ENCOUNTER FOR OTHER GENERAL EXAMINATION: ICD-10-CM

## 2019-01-25 DIAGNOSIS — Z90.49 ACQUIRED ABSENCE OF OTHER SPECIFIED PARTS OF DIGESTIVE TRACT: Chronic | ICD-10-CM

## 2019-01-25 PROCEDURE — 76642 ULTRASOUND BREAST LIMITED: CPT

## 2019-01-25 PROCEDURE — 76642 ULTRASOUND BREAST LIMITED: CPT | Mod: 26,50

## 2019-01-31 ENCOUNTER — APPOINTMENT (OUTPATIENT)
Dept: FAMILY MEDICINE | Facility: CLINIC | Age: 54
End: 2019-01-31
Payer: COMMERCIAL

## 2019-01-31 VITALS
BODY MASS INDEX: 28.77 KG/M2 | SYSTOLIC BLOOD PRESSURE: 124 MMHG | DIASTOLIC BLOOD PRESSURE: 85 MMHG | HEIGHT: 66 IN | WEIGHT: 179 LBS | OXYGEN SATURATION: 98 % | HEART RATE: 65 BPM | RESPIRATION RATE: 14 BRPM

## 2019-01-31 DIAGNOSIS — L30.9 DERMATITIS, UNSPECIFIED: ICD-10-CM

## 2019-01-31 DIAGNOSIS — N63.10 UNSPECIFIED LUMP IN THE RIGHT BREAST, UNSPECIFIED QUADRANT: ICD-10-CM

## 2019-01-31 DIAGNOSIS — N63.20 UNSPECIFIED LUMP IN THE LEFT BREAST, UNSPECIFIED QUADRANT: ICD-10-CM

## 2019-01-31 PROCEDURE — 99212 OFFICE O/P EST SF 10 MIN: CPT

## 2019-01-31 RX ORDER — MELOXICAM 15 MG/1
15 TABLET ORAL
Refills: 0 | Status: COMPLETED | COMMUNITY
Start: 2018-07-02 | End: 2019-01-31

## 2019-02-01 PROBLEM — N63.10 BREAST MASS, RIGHT: Status: ACTIVE | Noted: 2019-01-28

## 2019-02-01 PROBLEM — N63.20 BREAST MASS, LEFT: Status: ACTIVE | Noted: 2019-01-28

## 2019-02-01 PROBLEM — L30.9 DERMATITIS: Status: ACTIVE | Noted: 2019-01-31

## 2019-02-01 NOTE — PHYSICAL EXAM
[No Acute Distress] : no acute distress [Well Nourished] : well nourished [Well Developed] : well developed [Well-Appearing] : well-appearing [Normal Sclera/Conjunctiva] : normal sclera/conjunctiva [Normal Gait] : normal gait [Normal Affect] : the affect was normal [Normal Insight/Judgement] : insight and judgment were intact [de-identified] : patches of maculopapular erythema and hypertrophy of skin on wrists and dorsal aspect of each hand

## 2019-02-01 NOTE — HISTORY OF PRESENT ILLNESS
[de-identified] : Patient presents for follow up. She has been experiencing a rash on her hands that is very itchy for the past three months. She has tried steroid cream with some relief but it is difficult to keep anything on because she washes her hands frequently at work.\par She has a number of appointments scheduled with her specialists and needs referrals. \par -Saw ENT for difficulty swallowing- may be secondary to reflux- no recurrence but will be following up\par -Saw vascular for legs\par -Saw endocrine for thyroid\par -Seeing ortho tomorrow for knee\par -Seeing Rheum on March 5th \par -Will be pursuing surgery for carpal tunnel\par -Had mammo and breast ultrasound indicating nodules- follow up scheduled for 6 months

## 2019-02-01 NOTE — REVIEW OF SYSTEMS
[Joint Pain] : joint pain [Skin Rash] : skin rash [Negative] : Psychiatric [FreeTextEntry4] : dysphagia

## 2019-02-04 ENCOUNTER — APPOINTMENT (OUTPATIENT)
Dept: ORTHOPEDIC SURGERY | Facility: CLINIC | Age: 54
End: 2019-02-04
Payer: COMMERCIAL

## 2019-02-04 VITALS
BODY MASS INDEX: 28.77 KG/M2 | HEART RATE: 68 BPM | HEIGHT: 66 IN | WEIGHT: 179 LBS | SYSTOLIC BLOOD PRESSURE: 122 MMHG | DIASTOLIC BLOOD PRESSURE: 80 MMHG

## 2019-02-04 DIAGNOSIS — G56.02 CARPAL TUNNEL SYNDROME, LEFT UPPER LIMB: ICD-10-CM

## 2019-02-04 DIAGNOSIS — G56.01 CARPAL TUNNEL SYNDROME, RIGHT UPPER LIMB: ICD-10-CM

## 2019-02-04 DIAGNOSIS — S89.91XA UNSPECIFIED INJURY OF RIGHT LOWER LEG, INITIAL ENCOUNTER: ICD-10-CM

## 2019-02-04 DIAGNOSIS — S99.921A UNSPECIFIED INJURY OF RIGHT FOOT, INITIAL ENCOUNTER: ICD-10-CM

## 2019-02-04 PROCEDURE — 73564 X-RAY EXAM KNEE 4 OR MORE: CPT | Mod: RT

## 2019-02-04 PROCEDURE — 99214 OFFICE O/P EST MOD 30 MIN: CPT

## 2019-02-04 PROCEDURE — 73630 X-RAY EXAM OF FOOT: CPT | Mod: RT

## 2019-02-05 NOTE — PATIENT INSTRUCTIONS
[FreeTextEntry1] : HAND SURGERY PATIENTS\par Instructions: Trigger finger, CTR, de Quervain's, etc. \par \par 1. Maintain hand elevation for 24 to 48 hours. Elevation is one of the best ways to control pain. Swelling usually peaks during this period. After 48 hours, you do not need to maintain elevation if there is no "throbbing" when your hand is lowered. NOTE: Your hand does not have to be elevated continuously. \par \par 2. Bathing: Keep your dressing dry. You may wash exposed fingers with a washcloth. You may shower or bathe with a plastic bag protecting the dressing/cast. Once you've changed the dressing, you may bathe with a disposable glove or bag over the wound.\par \par 3. Dressing: Ask if you may change your dressing two days after surgery. If changing the dressing is allowed, apply 3" x 3" gauze and secured with 2 inch generic Coban. Change bandage as needed. May remove bandage and may use hand for ADL with no dressing, if there is no bleeding, inside the house. Once you change the dressing, you must reapply a new clean dressing at least once every day.\par \par 4. Exercise: It is important to move your fingers, shoulder, and elbow to prevent stiffness. Fully opening and closing your several times per day to maintain full range of motion is essential. When you can readily open and close your fingers fully without pain, you may reduce the frequency of exercising. Nonetheless, it is important to exercise 3 to 4 times each day, even when movement becomes easy and painless. You may perform simple activities under 2 to 3 pounds, e.g., holding a phone, writing, simple keyboarding, using eating utensils. \par \par 5. Pain: Numbness from the nerve block (local anesthesia) usually lasts 4-8 hours, but sometimes lasts more than 24 hours. Once pain starts, take pain medicine as prescribed. Remember, elevation is one of the best ways to control pain. Pain is normal during and following exercise periods. If necessary, take the prescribed medicine. Ask if you can substitute Tylenol, Advil, or Aleve. Note: You must not take more than 4000mg of Tylenol in 24 hours.\par \par 6. Bleeding: Blood staining on your dressing is very common, as is the appearance of "black and blue on exposed fingers or arm.\par \par 7. Swelling: Some finger swelling usually occurs. Exercises and elevation will help control swelling. Ask if you may loosen the dressing. It is important to verify that you may do this.\par \par 8. Infection: Post operative infections are rare. If you get PROGRESSIVE redness, swelling, and pain for more than 24 hour that does not respond to elevation and rest, or if you start to run a fever, call the office: [phone].\par \par 9. Call to schedule a follow up appointment, even if you read this prior to surgery. Arrange follow up approximately one week post operatively, or at the time recommended by your surgeon. \par \par Thank you.\par Patrick Diop MD\par \par \par

## 2019-02-05 NOTE — PHYSICAL EXAM
[de-identified] : Right-hand exam\par Long finger A1 pulley tenderness with active triggering.\par There is no other A1 pulley tenderness or triggering in any other finger, right hand.\par Right wrist\par Mild tenderness TFCC.\par Patient perceives clicking, but not elicited during physical exam.\par \par Left hand:\par Long finger A1 pulley tenderness was subtle triggering.\par There is no other A1 pulley tenderness or triggering in any other finger, left hand.\par \par Neurologic:\par The patient has residual diminished sensation in median distribution bilaterally. Following bilateral carpal tunnel surgery.\par Of note, severe CTS identified on nerve conduction study preoperatively\par \par --------------------------------------------------\par \par \par \par \par \par

## 2019-02-05 NOTE — HISTORY OF PRESENT ILLNESS
[FreeTextEntry1] : Ms. Bach presents to the office with two problems. Pt fell today and injured right foot/ankle.\par \par \par Regarding right hand:\par Pt has triggering, right long finger. Patient declines additional cortisone injections, and wishes to proceed with surgery for right long trigger finger\par Patient has recent onset mild triggering left long finger. At the time of surgery for a right long trigger finger, cortisone injection can be administered for the left long finger.\par  She is s/p right thumb release and right long trigger finger cortisone injection on 1/17/18. \par  right middle finger triggering,\par Pt has ongoing  numbness and tingling right 2nd, 3rd, 4th finger.\par Pt has intermitted ulnar right wrist pain.  She also has pain in the left  3rd finger.

## 2019-02-11 ENCOUNTER — OUTPATIENT (OUTPATIENT)
Dept: OUTPATIENT SERVICES | Facility: HOSPITAL | Age: 54
LOS: 1 days | End: 2019-02-11
Payer: COMMERCIAL

## 2019-02-11 VITALS
HEART RATE: 76 BPM | DIASTOLIC BLOOD PRESSURE: 70 MMHG | SYSTOLIC BLOOD PRESSURE: 128 MMHG | OXYGEN SATURATION: 98 % | RESPIRATION RATE: 18 BRPM | WEIGHT: 184.09 LBS | HEIGHT: 66 IN | TEMPERATURE: 99 F

## 2019-02-11 DIAGNOSIS — Z98.890 OTHER SPECIFIED POSTPROCEDURAL STATES: Chronic | ICD-10-CM

## 2019-02-11 DIAGNOSIS — E03.9 HYPOTHYROIDISM, UNSPECIFIED: ICD-10-CM

## 2019-02-11 DIAGNOSIS — Z01.818 ENCOUNTER FOR OTHER PREPROCEDURAL EXAMINATION: ICD-10-CM

## 2019-02-11 DIAGNOSIS — M65.331 TRIGGER FINGER, RIGHT MIDDLE FINGER: ICD-10-CM

## 2019-02-11 DIAGNOSIS — Z90.49 ACQUIRED ABSENCE OF OTHER SPECIFIED PARTS OF DIGESTIVE TRACT: Chronic | ICD-10-CM

## 2019-02-11 DIAGNOSIS — M65.332 TRIGGER FINGER, LEFT MIDDLE FINGER: ICD-10-CM

## 2019-02-11 PROBLEM — E78.5 HYPERLIPIDEMIA, UNSPECIFIED: Chronic | Status: INACTIVE | Noted: 2018-01-11 | Resolved: 2019-02-11

## 2019-02-11 PROBLEM — M06.9 RHEUMATOID ARTHRITIS, UNSPECIFIED: Chronic | Status: ACTIVE | Noted: 2017-06-26

## 2019-02-11 PROBLEM — G56.00 CARPAL TUNNEL SYNDROME, UNSPECIFIED UPPER LIMB: Chronic | Status: INACTIVE | Noted: 2017-06-26 | Resolved: 2019-02-11

## 2019-02-11 PROBLEM — A04.8 OTHER SPECIFIED BACTERIAL INTESTINAL INFECTIONS: Chronic | Status: ACTIVE | Noted: 2017-06-26

## 2019-02-11 PROBLEM — M65.311 TRIGGER THUMB, RIGHT THUMB: Chronic | Status: INACTIVE | Noted: 2018-01-11 | Resolved: 2019-02-11

## 2019-02-11 PROBLEM — K64.8 OTHER HEMORRHOIDS: Chronic | Status: INACTIVE | Noted: 2017-06-26 | Resolved: 2019-02-11

## 2019-02-11 LAB
ANION GAP SERPL CALC-SCNC: 15 MMOL/L — SIGNIFICANT CHANGE UP (ref 5–17)
BUN SERPL-MCNC: 14 MG/DL — SIGNIFICANT CHANGE UP (ref 7–23)
CALCIUM SERPL-MCNC: 9.3 MG/DL — SIGNIFICANT CHANGE UP (ref 8.4–10.5)
CHLORIDE SERPL-SCNC: 105 MMOL/L — SIGNIFICANT CHANGE UP (ref 96–108)
CO2 SERPL-SCNC: 21 MMOL/L — LOW (ref 22–31)
CREAT SERPL-MCNC: 0.57 MG/DL — SIGNIFICANT CHANGE UP (ref 0.5–1.3)
GLUCOSE SERPL-MCNC: 142 MG/DL — HIGH (ref 70–99)
HCT VFR BLD CALC: 37.6 % — SIGNIFICANT CHANGE UP (ref 34.5–45)
HGB BLD-MCNC: 12.4 G/DL — SIGNIFICANT CHANGE UP (ref 11.5–15.5)
MCHC RBC-ENTMCNC: 28.9 PG — SIGNIFICANT CHANGE UP (ref 27–34)
MCHC RBC-ENTMCNC: 33 GM/DL — SIGNIFICANT CHANGE UP (ref 32–36)
MCV RBC AUTO: 87.6 FL — SIGNIFICANT CHANGE UP (ref 80–100)
PLATELET # BLD AUTO: 241 K/UL — SIGNIFICANT CHANGE UP (ref 150–400)
POTASSIUM SERPL-MCNC: 4.1 MMOL/L — SIGNIFICANT CHANGE UP (ref 3.5–5.3)
POTASSIUM SERPL-SCNC: 4.1 MMOL/L — SIGNIFICANT CHANGE UP (ref 3.5–5.3)
RBC # BLD: 4.29 M/UL — SIGNIFICANT CHANGE UP (ref 3.8–5.2)
RBC # FLD: 14.2 % — SIGNIFICANT CHANGE UP (ref 10.3–14.5)
SODIUM SERPL-SCNC: 141 MMOL/L — SIGNIFICANT CHANGE UP (ref 135–145)
WBC # BLD: 5.46 K/UL — SIGNIFICANT CHANGE UP (ref 3.8–10.5)
WBC # FLD AUTO: 5.46 K/UL — SIGNIFICANT CHANGE UP (ref 3.8–10.5)

## 2019-02-11 PROCEDURE — 80048 BASIC METABOLIC PNL TOTAL CA: CPT

## 2019-02-11 PROCEDURE — G0463: CPT

## 2019-02-11 PROCEDURE — 85027 COMPLETE CBC AUTOMATED: CPT

## 2019-02-11 RX ORDER — LEVOTHYROXINE SODIUM 125 MCG
1 TABLET ORAL
Qty: 0 | Refills: 0 | COMMUNITY

## 2019-02-11 RX ORDER — LIDOCAINE HCL 20 MG/ML
0.2 VIAL (ML) INJECTION ONCE
Qty: 0 | Refills: 0 | Status: DISCONTINUED | OUTPATIENT
Start: 2019-02-20 | End: 2019-03-07

## 2019-02-11 RX ORDER — SODIUM CHLORIDE 9 MG/ML
3 INJECTION INTRAMUSCULAR; INTRAVENOUS; SUBCUTANEOUS EVERY 8 HOURS
Qty: 0 | Refills: 0 | Status: DISCONTINUED | OUTPATIENT
Start: 2019-02-20 | End: 2019-03-07

## 2019-02-11 NOTE — H&P PST ADULT - LAST ECHOCARDIOGRAM
Refill requested for:     Levothyroxine 112 mcg  Last filled on:     11/17/2016 # 30 +1  Last office visit:     11/17/2016      Medication refilled per protocol.     none

## 2019-02-11 NOTE — H&P PST ADULT - ATTENDING COMMENTS
Pt has triggering, right long finger. Patient declines additional cortisone injections, and wishes to proceed with surgery for right long trigger finger  Patient has recent onset mild triggering left long finger. At the time of surgery for right long trigger finger, cortisone injection will be administered for the left long finger.    Surgery for right long finger trigger finger is indicated because of symptoms refractory to conservative treatment. The patient has pain and interference with activities of daily living. While the patient may see improvement, the patient may not have complete range of motion or complete return to activities of daily living. Postoperative hand therapy, and other treatment modalities may be required The range of treatment alternatives, and the associated risks complications limitations and expectations were explained and discussed. Incomplete range of motion, stiffness, pain, palmar fibromatosis are a just few of many factors reviewed and discussed with the patient. All questions have been answered. The patient has expressed acceptance and understanding of the above and has consented to surgery.

## 2019-02-11 NOTE — H&P PST ADULT - PSH
CHIEF COMPLAINT:   Follow-up of lung cancer and further recommendations.     HISTORY OF PRESENT ILLNESS:   Radha Montgomery is a pleasant, 70 year old female who is here for follow-up of relapsed lung cancer.     The patient was diagnosed on 8/13/12 with stage III invasive moderately differentiated adenocarcinoma of the left lung. The patient had 4 cycles of chemotherapy with cisplatin-pemetrexed and radiation therapy. She relapsed on 1/28/14 in the left lung, right adrenal gland, liver and left iliac bone. EGFR mutation was negative. The patient then completed 4 cycles of carboplatin, pemetrexed and bevacizumab beginning on 3/5/14. She completed 4 cycles of this regimen. The carboplatin was then removed and she continued on pemetrexed and bevacizumab. She completed 3 cycles of that combination. CT scan on 8/26/14 showed disease progression. She started on a trial of docetaxel on 12/3/14 and completed 6 cycles on 4/15/15. CT scan 3/2/15 showed significant improvement in the metastatic lesions. She also completed whole brain radiation for multiple new small brain metastases.    The patient then received pembrolizumab (Keytruda), given every 3 weeks beginning on 6/24/15. After cycle 16 of pembrolizumab on 5/4/16, repeat CT scan of the chest, abdomen, and pelvis on 6/9/16 showed stable lung findings but interval development of retroperitoneal and iliac chain lymphadenopathy, the largest lymph node appearing in the left external iliac chain measuring 16 mm in short axis.  Ms. Montgomery began treatment with the novel checkpoint inhibitor, nivolumab (Opdivo) on 7/6/16.     She continued nivolumab with cycle 2 on 7/20/16. MRI of the lumbar spine on 8/2/16 showed metastatic implants at L4 and T11 but no fractures. She proceeded with cycle 3 of nivolumab on 8/3/16 and cycle 4 on 8/17/16. Brain MRI on 8/23/16 was negative for metastasis. After cycle 5 of nivolumab on 8/31/16, restaging body CT scans on 9/12/16 showed stable  size of the left suprahilar mass but significant decrease in size of the retroperitoneal/iliac chain adenopathy. There may have been right adrenal \"pseudoprogression\" appreciated (increased size due to necrosis/treatment effect).     She proceeded through cycle 12 on 1/4/17 and tolerated the treatments well except some fatigue, but CT scan on 1/11/17 showed interval enlargement of some pulmonary nodules, development of new liver lesion and enlargement of metastatic lesion in the right adrenal gland, compatible with progression of disease. Therefore we discontinued nivolumab and she began chemotherapy using gemcitabine (Gemzar) 1000 mg/m2 days 1 and 8. Cycle 1 began on 1/18/17. She also continued monthly zoledronic acid on 2/1/17, when her platelet count was found to be 50,000. She met with Dr. Ring on 2/3/17 to discuss possible palliative radiotherapy but reported improvement/resolution in her back discomfort. We dose-reduced gemcitabine to 850 mg/m2 with cycle 2 beginning on 2/8/17 and cycle 3 beginning on 3/1/17. Repeat CT scan of the chest, abdomen, and pelvis on 3/13/17 showed enlargement of the dominant left suprahilar mass to 6.0 x 2.8 x 5.4 cm (from 5.5 x 3.0 x 4.8 cm) and several stable lung nodules (except one RLL nodule shrunk from 7 to 4 mm), liver lesions, and sclerotic bone lesions.    Request for pre-authorization to begin erlotinib (Tarceva) 150 mg by mouth daily was submitted to her insurance on 3/14/17, but they demanded a letter of medical necessity, which I submitted. She finally began erlotinib on 4/12/17. The patient experienced diarrhea that was eventually controlled on a combination of loperamide and Lomotil. She was able to travel from 5/12 to 5/16/17. Repeat CT scan on 5/18/17 showed no change in the dominant irregular left suprahilar mass abutting the left posterior mediastinum (measuring 2.5 x 4.8 x 6 cm) but growth of one metastatic pulmonary nodule within the superior segment of right  lower lobe from 4 to 9 mm, otherwise stable scattered nodules, interval enlargement of the right adrenal metastasis from 1.7 to 2.0 cm, central necrosis of the left adrenal metastasis, and interval worsening of a blastic metastasis involving the left 11th rib, with new soft tissue component involving the left paraspinal musculature. Erlotinib was discontinued.     The patient was brought to the ED for expressive aphasia on 6/9/17 and CT scan of the head showed no acute intracranial abnormalities, with age-related volume loss and probable chronic microvascular ischemic changes. Bilateral carotid ultrasound on 6/12/17 was essentially normal. MRI of the brain on 6/15/17 showed age-related brain volume loss and microvascular ischemic changes, mild left mastoid effusion or mastoiditis, but no acute infarct or other acute intracranial process. Dr. Ring administered palliative radiotherapy to the painful left posterior 11th rib lesion (given worsening of a blastic metastasis seen on CT scan) including 2000 Gy in 5 fractions from 6/8 to 6/14/17. She continued monthly Zometa and began docetaxel-ramucirumab with cycle 1 on 6/19/17 and cycle 2 on 7/19/17. Screening mammogram on 8/4/17 suggested a right breast asymmetry but diagnostic mammogram with tomosynthesis was negative for malignancy. She continued with cycle 3 of docetaxel-ramucirumab on 8/16 and cycle 4 on 9/13/17. The patient was prescribed benzonatate and levofloxacin for a cough but her daughter reported severe diffuse weakness and inability to ambulate on 9/21/17 and she was admitted for neutropenic fever with temperatures to 99.5 F (baseline 97s). CT angiography of the thorax on 9/22/17 was negative for pulmonary embolism and showed overall stability in the left suprahilar mass with a more prominent superior margin with new contiguous 8mm nodule at the left lung apex, but overall decreasing size subcentimeter nodules within the bilateral lower lobes. She received  filgrastim-sndz (Zarxio) 480 mcg daily from 9/22 to 9/24 with normalization of WBC count. She received empiric antibiotics but infectious workup only showed rhinovirus. The patient's clinical status improved but OT recommended subacute rehabilitation upon discharge 9/27 (she left the SNF after 2 days of rehab).     She resumed treatment with cycle 5 of docetaxel-ramucirumab on 10/11/17 along with monthly Zometa.    The patient returns for follow-up feeling well overall. ECOG performance status is 0. She has been taking care of her grandsons 3 days per week. Body weight is up 0.8 kilograms and appetite is intact on mirtazapine. Her bilateral (right > left) ankle and leg swelling has persisted, but her right leg pain is improving with home physical therapy and she loves her physical therapist. The sharp pain in the left mid-back region has remained quiescent since palliative radiation therapy and with nightly massage by her daughter. There is no rash. She uses ipratropium-albuterol (nebs and Combivent) as needed for wheezing. She also denies any chills, sweats, chest pain, shortness of breath at rest, dizziness, lightheadedness, palpitations. There is no focal weakness, vision change, aphasia, or confusion.     Past medical history, family history, social history, allergies and medications have all been reviewed as documented in the Epic system, and I agree with them and are updated.     REVIEW OF SYSTEMS:   Apart from that described in the review of systems as above in the History of Present Illness, the remainder of the review of systems is documented in the Epic system, and I agree with them.     PHYSICAL EXAMINATION:   Vitals:    /62 (BP Location: [facility], Patient Position: Sitting, Cuff Size: Regular)   Pulse 107   Temp 97.5 °F (36.4 °C) (Oral)   Resp 18   Ht 5' 3\" (1.6 m)   Wt 78.5 kg   SpO2 96%   BMI 30.68 kg/m²    Contitutional: Well developed, well nourished, no acute distress, non-toxic  appearance  Eyes: anicteric sclerae, conjunctiva normal  HENT: alopecia, oropharynx moist, no pharyngeal exudates.   Neck: Supple. No palpable adenopathy in the neck. No subcutaneous nodules.  No thyroid enlargement.  Respiratory: No respiratory distress, reduced anterior breath sounds, no rales, inspiratory > expiratory wheezing bilaterally. No accessory muscles of respiration.   Breasts: Not done.   Cardiovascular: Normal rate, normal rhythm, no murmurs, no gallops, no rubs  Gastrointestinal: Soft, nondistended, normal bowel sounds, nontender, no hepatosplenomegaly, no mass, no rebound, no guarding  Musculoskeletal: [place] area of swelling in the left mid-back area (posterior ribs) measuring approximately 6 cm2 (stable and non-tender) with no overlying warmth, erythema, discoloration, or drainage.  Lymphatic: No adenopathy in the supraclavicular, axillary or inguinal regions.   Neurologic: Alert and oriented x 3, sensation intact, right leg 5/5, left leg 4/5 strength, bilateral upper extremities 4+/5 strength  Extremities: +1 pretibial edema bilaterally (right > left) with bilateral +1 ankle edema  Psychiatric: Speech and behavior appropriate. Affect was pleasant.   Skin/mucosa: Examination of skin and mucosa reveal no evidence of rash or petechiae or subcutaneous nodules.       Recent Labs  Lab 11/08/17  0846 10/25/17  0905 10/23/17  1156 10/11/17  0832 09/29/17  0735  09/13/17  0921   WBC 8.8 7.2 9.0 5.6 13.4*  < > 6.1   RBC 3.12* 3.05* 3.13* 3.19* 3.5*  < > 3.26*   HGB 9.8* 9.3* 9.6* 9.9* 10.6*  < > 10.2*   HCT 31.8* 30.8* 31.9* 31.2* 34  < > 32.0*   .9* 101.0* 101.9* 97.8 96  < > 98.2    148 123* 217 167  < > 153   ANEUT 8.0* 6.2 7.6 4.9  --   --  5.6   ALYMS 0.5* 0.6* 0.7* 0.3*  --   --  0.3*   ZHOU 0.3 0.5 0.7 0.3  --   --  0.2*   AEOS 0.0* 0.0* 0.0* 0.0*  --   --  0.0*   ABASO 0.0 0.0 0.0 0.0  --   --  0.0   < > = values in this interval not displayed.    Recent Labs  Lab 11/08/17  0846  10/25/17  0905 10/11/17  0832 09/29/17  0735 09/28/17  0450  09/23/17  0327  09/22/17 0020 09/13/17 0921 08/02/17  0825   GLUCOSE 155* 109* 134* 98 82  < > 83  --  108* 227*  < > 122*   SODIUM 143 141 137 138 140  < > 139  --  136 139  < > 142   POTASSIUM 3.9 3.8 4.2 4.4 3.6  < > 3.6  < > 3.4 3.7  < > 3.7   CHLORIDE 109* 108* 101 101 109*  < > 108*  --  102 104  < > 109*   BUN 21* 11 18 8 5*  < > 6  --  11 21*  < > 13   CREATININE 0.92 1.01* 0.89 1.01 0.87  < > 0.94  --  1.16* 0.87  < > 0.92   CALCIUM 8.8 8.3* 8.6 9.0 8.4  < > 7.1*  --  8.2* 7.8*  < > 7.9*   MG 1.8 1.8 1.8  --   --   --  1.5*  --   --   --   --  1.8   TOTPROTEIN 6.1* 6.0* 6.5  --   --   --   --   --  5.8* 6.4  < > 6.1*   ALBUMIN 3.0* 2.9* 3.2*  --   --   --   --   --  2.7* 3.0*  < > 2.9*   AST 15 19 17  --   --   --   --   --  19 15  < > 23   ALKPT 58 79 59  --   --   --   --   --  47 58  < > 60   GPT 21 18 20  --   --   --   --   --  17 21  < > 23   < > = values in this interval not displayed.    Recent Labs  Lab 11/08/17  0846 10/25/17  0905 10/11/17  0832 09/29/17  0735 09/28/17  0450  09/22/17  0020 09/13/17  0921   ANIONGAP 16 12 16 14 11  < > 12 19   GLOB 3.1 3.1 3.3  --   --   --  3.1 3.4   BILIRUBIN 0.3 0.3 0.3  --   --   --  0.4 0.3   < > = values in this interval not displayed.      RADIOLOGICAL DATA REVIEWED:   CT scan of the chest, abdomen, and pelvis on 6/9/16:   6/9/2016   IMPRESSION:    1. Interval development of retroperitoneal and iliac chain lymphadenopathy as described. Largest lymph node in the left external iliac chain measuring 16 mm in short axis. Findings suspicious for disease recurrence. PET/CT could be considered for further assessment. 2. Stable left suprahilar mass lesion. 3. Stable sclerotic bone lesions of L4, L5, S1, and the left iliac bone. 4. Small pericardial effusion. 5. Cholelithiasis.      Mri Lumbar Spine  8/2/2016    IMPRESSION:  There are multiple osseous metastases which is most extensive at L4 vertebral  body as noted above without pathologic fracture. There is also osseous metastases involving the T11 vertebral body and left iliac bone which is incompletely imaged. 2. There is no leptomeningeal enhancements. 3. Prominent degenerative changes of the lumbar spine with moderate to severe spinal canal stenosis at L3-L4 and L5-S1. 4. No evidence for discitis or epidural abscess. 5. Enlarged left common iliac lymph nodes      Mri Brain  8/23/2016   IMPRESSION:   Mildly motion degraded.  1.  No evidence of enhancing intracranial metastasis. 2.  Resolution of the previously identified left paracentral lobule vaguely enhancing lesion. 3.  Age-related volume loss and probable chronic microvascular ischemic changes.       Ct Chest Abdomen Pelvis  9/12/2016   IMPRESSION: 1.  Stable left suprahilar mass. 2.  Previously seen retroperitoneal/iliac chain adenopathy has significantly decreased in size. 3.  Right adrenal metastatic recurrence, retrospectively present but smaller on the prior exam in June. The interval increase in size from June to the current exam could be related to pseudoprogression given the necrosis and the recent change in immunetherapy. Continued attention on follow-up is recommended. Short interval follow-up with CT of the abdomen and could also be considered. 4.  Stable sclerotic osseous metastases.     Ct Chest Abdomen Pelvis  Result Date: 1/11/2017  IMPRESSION: Interval enlargement of some pulmonary nodules, development of new liver lesion and enlargement of metastatic lesion in the right adrenal gland. Overall findings compatible with progression of disease.    Ct Chest Abdomen Pelvis  Result Date: 3/14/2017  IMPRESSION: 1.  Mild interval enlargement of a 6.0 x 2.8 x 5.4 cm left suprahilar mass with superomedial shift of the mass to abut the superior left mediastinum. This is likely related to volume loss/atelectasis in the adjacent posteromedial left upper lobe due to bronchial obstruction by the mass.  2.  Multiple bilateral pulmonary nodules measuring up to 6 mm. Most of these are stable compared to the prior study, with interval decrease in the size of a 4 mm right lower lobe nodule when compared to the prior CT dated 1/11/2017. 3.  Multiple hypoattenuating lesions within the liver, measuring up to 1.3 cm, similar to the prior study. Findings remain concerning for metastatic disease. 4.  Unchanged right adrenal lesion, consistent with known metastatic disease. Additional 8 mm left adrenal lesion, unchanged dating back to prior CT chest dated 7/18/2012 and suggestive of a benign adrenal adenoma. 5.  Sclerotic lesions involving the left iliac bone, sacrum, lower lumbar spine, and left 11th rib as described above, stable compared to the prior study and consistent with osseous metastatic disease. 6.  Cholelithiasis is suspected.     CT scan of the chest, abdomen, and pelvis with contrast on 5/18/17:  IMPRESSION: 1. Stable large irregular mass within the left suprahilar region abutting rhe left posterior mediastinum. 2. Interval enlargement of one metastatic pulmonary nodule within the superior segment of right lower lobe, otherwise scattered nodules are stable. 3. Interval enlargement of metastases of both adrenal glands as described above 4. Interval worsening of a blastic metastasis involves left 11th rib, with new soft tissue component is seen involving the left paraspinal musculature.    Ct Head Brain  Result Date: 6/9/2017  IMPRESSION:  1.  No acute intracranial abnormalities. 2.  Age-related volume loss and probable chronic microvascular ischemic changes.    Us Carotid Duplex Bilateral  Result Date: 6/12/2017  IMPRESSION: 1. This study is essentially normal. Using NASCET criteria adapted to duplex ultrasound, there is no sonographic evidence of hemodynamically significant carotid artery stenosis bilaterally. 2. Vertebral artery flow is antegrade bilaterally.     Mri Brain  Result Date: 6/15/2017  IMPRESSION:   1.  Age-related brain volume loss and microvascular ischemic changes as above. 2.  No acute infarct or other acute intracranial process. 3.  Mild left mastoid effusion or mastoiditis.     Mammo Diagnostic W Audie Right  Result Date: 8/11/2017  IMPRESSION:  No mammogram evidence malignancy. Recommend routine follow-up. Results were discussed with the patient. BI-RADS 2 - Benign. In compliance with federal regulations, the patient will be sent a lay summary of the results of this mammogram.     Us Lower Extremity Venous Duplex Bilat  Result Date: 9/14/2017  IMPRESSION:  Normal duplex scan of the inferior vena cava, iliac, common femoral, profunda femoral, superficial femoral, popliteal, gastrocnemius, posterior tibial, and peroneal veins bilaterally.  No evidence of deep vein thrombosis.     Ct Chest Pe Imaging  Result Date: 9/22/2017  IMPRESSION: 1. No evidence of pulmonary embolism. 2. Left suprahilar mass which is similar in size to prior study from 5/18/2017 but demonstrates more prominent superior margin with new contiguous 8mm nodule at the left lung apex. Continued attention at follow-up is recommended. 3. Trace left pleural effusion, slightly increased from prior. 4. Decreasing postobstructive left upper lobe atelectasis. 5. Decreasing size subcentimeter nodules within the bilateral lower lobes as described.      ASSESSMENT:  Radha Montgomery is a 70 year old female with the following problems and, after discussion with the patient, we have agreed upon the following plan:    1. Invasive moderately differentiated adenocarcinoma of the left lung, initially diagnosed with stage III disease on 8/13/12, now stage IV  - Status post cisplatin-pemetrexed x 4 cycles and radiation therapy to the left lung with 6700 cGy in 36 fractions from 9/24 - 11/28/12   - Relapsed on 1/28/14 in the left lung, right adrenal gland, liver and left iliac bone (EGFR mutation was negative)   - Status post  carboplatin-pemetrexed-bevacizumab x 4 cycles  - Status post pemetrexed-bevacizumab x 3 cycles.   - CT scan 8/26/14 showed disease progression.   - Status post docetaxel x 6 cycles from 12/3/14 to 4/15/15.   - CT scan 3/2/15 showed significant improvement in the metastatic lesions.   - Status palliative whole brain radiation treatments with 3000 cGy in 10 fractions from 12/15 - 12/29/14 for multiple new small brain metastases.   - Began pembrolizumab on 6/24/15. After 9 cycles, restaging CT scan on 12/28/15 showed stable disease in the lungs and bones except there was a new right lower lobe cluster of pulmonary nodules, a \"nonspecific finding which could relate to infectious, inflammatory or malignant etiologies.\" Since she was having URI symptoms that could potentially explain the new nodules, and because she enjoyed an excellent quality of life on pembrolizumab, she agreed to continue.   - Restaging CT scan on 2/9/16 showed stable disease with no new findings.   - After cycle 16 of pembrolizumab on 5/4/16, she missed her planned cycle 17 on 5/25/16 due to ophthalmologic issues and repeat CT scan on 6/9/16 showed stable lung findings but interval development of retroperitoneal and iliac chain lymphadenopathy, the largest lymph node appearing in the left external iliac chain measuring 16 mm in short axis.    - Began nivolumab (Opdivo), a novel checkpoint inhibitor that showed superior overall survival benefit (12.2 vs 9.4 months, p=0.002) compared with docetaxel for advanced non-squamous non-small cell lung cancer (N Engl J Med 2015; 373:4283-2281) at 3 mg/kg IV every 2 weeks.   - Cycle 1 of nivolumab on 7/6/16, cycle 2 on 7/20/16, cycle 3 on 8/3/16, cycle 4 on 8/17/16, and cycle 5 on 8/31/16.   - MRI of the lumbar spine on 8/2/16 showed multiple osseous metastases most extensive at L4 vertebral body without pathologic fracture, as well as T11  - Status post palliative radiation therapy from L3-sacrum with 3000cGy  in 10 fractions  - Brain MRI on 8/23/16 was negative for metastasis  - Restaging CT scans on 9/12/16 showed stable size of the left suprahilar mass but significant decrease in size of the retroperitoneal/iliac chain adenopathy. There is likely right adrenal \"pseudoprogression\" appreciated (increased size due to necrosis/treatment effect).    - Continued nivolumab with cycle 6 on 9/14/16 and cycle 7 on 9/28/16  - Restarted zoledronic acid 4 mg IV q28 days beginning 9/28/16  - Nivolumab given through cycle 14 on 1/4/17  - Left back protrusion noted clinically  - CT scan on 1/11/17 showed interval enlargement of some pulmonary nodules, development of new liver lesion and enlargement of metastatic lesion in the right adrenal gland, compatible with progression of disease.  - Began gemcitabine (Gemzar) 1000 mg/m2 days 1 and 8. Cycle 1 began on 1/18/17.  - Developed grade 2 thrombocytopenia with platelet count of 50,000 on 2/1/17  - Dose-reduced gemcitabine to 850 mg/m2 with cycle 2 on 2/8/17 and cycle 3 on 3/1/17  - Repeat CT scan on 3/13/17 showed enlargement of her left suprahilar mass to 6.0 x 2.8 x 5.4 cm (from 5.5 x 3.0 x 4.8 cm) as well as multiple bilateral pulmonary nodules measuring up to 6 mm (most of which were stable, with interval decrease in the size of a right lower lobe nodule from 7 to 4 mm), multiple hypoattenuating liver lesions measuring up to 1.3 cm (similar to the prior study), an unchanged right adrenal lesion, an unchanged sclerotic lesions involving the left iliac bone, sacrum, lower lumbar spine, and left 11th rib, all consistent with metastatic disease.   - Began erlotinib (Tarceva) 150 mg PO daily on 4/12/17 with grade 2 diarrhea  - Repeat CT scan on 5/18/17 showed no change in the dominant irregular left suprahilar mass abutting the left posterior mediastinum (measuring 2.5 x 4.8 x 6 cm) but growth of one metastatic pulmonary nodule within the superior segment of right lower lobe from 4 to 9  mm, otherwise stable scattered nodules, interval enlargement of the right adrenal metastasis from 1.7 to 2.0 cm, central necrosis of the left adrenal metastasis, and interval worsening of a blastic metastasis involving the left 11th rib, with new soft tissue component involving the left paraspinal musculature.   - Discontinued erlotinib  - Status post palliative radiotherapy to painful enlarging left posterior 11th rib lesion with Dr. Ring with 2000 Gy in 5 fractions from 6/8 to 6/14/17  - Began docetaxel-ramucirumab with cycle 1 on 6/19/17, cycle 2 on 7/19/17, cycle 3 on 8/16/17, and cycle 4 on 9/13/17    - CT angiography of the thorax on 9/22/17 showed stable to improved dominant left suprahilar mass to 5.1 x 5.9 x 3.2 cm (from 5.3 x 5.7 x 3.3 cm) with more prominent superior margins and an enlarging contiguous 8 mm nodule extending towards the left lung apex, slight increase in trace left pleural effusion, and several stable to improved nodules: nodule within the medial left lower lobe decreased from 7 to 6 mm, nodule within the right minor fissure is stable at 3 mm, decrease in nodule posteriorly within the right lower lobe from 8 to 4 mm; and a few stable hypoattenuating foci in the liver.  2. Bone involvement  - Osteopenia   - Bone scan on 11/21/14 showed osseous metastatic disease involve the left pelvis, left acetabulum, sacrum, and L4 vertebral body   - On Zometa q28 days since 9/28/16   - Pain and tenderness in left posterior ribs resolved after completion of palliative RT on 6/14/17  3. Neutropenic fever 9/21/17  - Cough in setting of neutropenia and temperatures to 99.5 F (baseline 97s), +rhinovirus   - Status post filgrastim-sndz (Zarxio) 480 mcg daily from 9/22 to 9/24 with WBC up to normal  - Status post empiric antibiotics  4. Leg swelling  - Bilateral symmetric ankle and pretibial edema  - Bilateral lower extremity ultrasound on 9/14/17 was normal with no evidence of deep vein thrombosis.   - More  likely due to hypoalbuminemia causing third-spacing along with dexamethasone causing fluid retention  - Risk of venous thromboembolism due to active malignancy but CTA thorax negative for PE on 9/22/17  5. Anorexia  - Body weight was trending down slowly but albumin stable to improved, diet favoring vegetables and [place] instant breakfast  - Started daily multivitamin 3/2017 and then mirtazapine 7/2017 with improvement  - Recommended referral to Nutrition but the patient still defers  6. History of transient ischemic attack  - CT head on 6/9/17, bilateral carotid ultrasound on 6/12/17, and MRI brain were negative for acute changes or evidence of metastatic disease  - Etiology likely benign (though BP normal on presentation) rather than malignant  7. Asthma  - on ipratropium-albuterol, advised that her insurance appears to favor the nebulized treatments  - on fluticasone nasal spray for post-nasal drip  8. Urinary frequency  - Complained of stress incontinence in 7/2017, but also found to have large leukocyte esterase on urinalysis 7/19/17  - Status post ciprofloxacin with resolution  - Also instructed Kegel exercises  9. Onychomycosis  - not a common adverse effect of checkpoint inhibitor therapy, but could be worsened from chemotherapy  - status post ketoconazole topical 2% cream  - previously referred to Podiatry given loss of toenails.      PLAN:  1. Labs and imaging results were personally reviewed with the patient.  2. Discussed the pathogenesis, natural history, prognosis, and management options for metastatic lung cancer.   3. After careful explanation of all possible risks, potential benefits, and likely side effects, the patient agrees to continue docetaxel 75 mg/m2 and ramucirumab 10 mg/m2, given once every 28 days until progression or intolerance. Today (11/8/17) is cycle 6.   4. Also continuing zoledronic acid today (11/8/17) and every 28 days.  5. Return 11/9 for day 2 Neulasta to prevent recurrent  complications from neutropenia.   6. Continue baby aspirin daily.  7. Best supportive measures:  - Begin daily multivitamin   - PT/OT, incentive spirometer, nebs, inhalers (refilled Combivent 8/16/17)  - Benzonatate and cough syrup as needed  - High protein foods, leg elevation, offered bilateral compression stockings (defers)  - Continue mirtazapine 15 mg PO qhs (defers offer to titrate dose)   - Reiterated dexamethasone on days 0, 1, and 2 of each cycle (compliant)  - Simethicone 80 mg PO every 6 hours as needed for gas (prescribed 4/26/17)  - Loperamide 2 mg by mouth every 2 hours as needed after any episode of diarrhea (reiterated medication teaching)  - Lomotil as needed (#24 with 3 refills given 4/12/17) for severe diarrhea (not requiring)  - Daily multivitamin  8. Continue home PT exercises.  9. Follow up with Dr. Ring as needed (if she develops new focal bone pain amenable to palliative radiotherapy).  10. Reiterated Kegel exercises as needed if any recurrent stress incontinence. Offered referral to Urology but the patient prefers to hold off.  11. Continue age-appropriate cancer screenings.   12. Next follow-up in 4 weeks with cycle 7 of Taxotere-Cyramza, monthly Zometa, and labs (CBC, CMP, Mg).  13. Written informed consent signed and in the chart. I will continually assess the patient's tolerance of and response to this treatment, and make changes as necessary. Advised regular use of incentive spirometer, anti-emetics as needed, and regular physical activity with adequate oral hydration and well-balanced nutrition. All risks and benefits, along with all expected side effects and toxicities, were discussed in detail with the patient, who expresses understanding and agreement with the plan.     Counseling and coordination:    I have discussed my findings and further recommendations with the patient and answered all questions and she has verbalized understanding and is in agreement. I spent 20 minutes in  face-to-face evaluation with the majority of the time being counseling and coordination, review of data and answering questions and discussions.     Thank you, Dr. Donnelly, for allowing me to participate in the care of this patient. Please do not hesitate to page me at [phone] with any questions or concerns.    Christian Howard MD   S/P appendectomy    S/P carpal tunnel release  right 7/2017  S/P carpal tunnel release  left 2016, trigger finger, 2016  S/P hernia repair  umbilical

## 2019-02-11 NOTE — H&P PST ADULT - HISTORY OF PRESENT ILLNESS
This is a 52 y/o female c/o both right and left  middle finger trigger associated with tenderness,  she presents today for right long finger trigger release and cortisone injection left long finger.  *** s/p fall at home 2/4/2019 sustained right foot sprain ligament, now right foot on BOOT CAM and no weight  bearing ****

## 2019-02-11 NOTE — H&P PST ADULT - PMH
H. pylori infection  2017  Hypothyroid    Rheumatoid arthritis  on no med  Trigger finger of right thumb H. pylori infection  2017  Hypothyroid    Rheumatoid arthritis  on no med  Rosacea H. pylori infection  2017  Hypothyroid    Rheumatoid arthritis  on no med  Rosacea    Vitamin D deficiency

## 2019-02-12 ENCOUNTER — APPOINTMENT (OUTPATIENT)
Dept: FAMILY MEDICINE | Facility: CLINIC | Age: 54
End: 2019-02-12
Payer: COMMERCIAL

## 2019-02-12 VITALS
OXYGEN SATURATION: 98 % | HEART RATE: 63 BPM | DIASTOLIC BLOOD PRESSURE: 74 MMHG | RESPIRATION RATE: 12 BRPM | TEMPERATURE: 97.9 F | SYSTOLIC BLOOD PRESSURE: 107 MMHG

## 2019-02-12 DIAGNOSIS — T70.29XA OTHER EFFECTS OF HIGH ALTITUDE, INITIAL ENCOUNTER: ICD-10-CM

## 2019-02-12 PROCEDURE — 99214 OFFICE O/P EST MOD 30 MIN: CPT

## 2019-02-13 NOTE — PLAN
[FreeTextEntry1] : 1. Pre-op clearance - RCRI class I risk. Had presurgical clearance at [facility] on 2/11/2019 - labs drawn, all normal. Stop taking meloxicam 1 week before surgery. \par 2. Prevention of altitude sickness - prescribe acetozolamide \par 3. Hypothyroidism - recently checked thyroid panel with endocrinologist. Increased synthroid to 75mcg from 50mcg. \par 4. GERD - continue famotidine.

## 2019-02-13 NOTE — HISTORY OF PRESENT ILLNESS
[FreeTextEntry8] : 54 yo F with PMH of hypothyroidism, GERD who presents for pre-op clearance for trigger finger of right middle finger on 2/20/2019. Went for presurgical testing yesterday in [place] had lab work and EKG done. Also traveling to [place] and wants medication to prevent altitude sickness. Recently sprained right ankle when slipped on ice and is wearing a walking boot. Recently prescribed meloxicam for right knee OA by ortho, will obtain knee MRI. \par \par Denies fevers, chills, CP, palpitations, SOB, cough. Able to walk flight of stairs without SOB, and walk several blocks without SOB. Admits to occasional episodes of dysphagia. Approx 2-3 episodes in 2 week period. Had laryngoscopy? with Dr. Lalo Carbajal (otolaryngologist) 2 weeks ago with normal results.

## 2019-02-13 NOTE — ASSESSMENT
[FreeTextEntry1] : 52 yo F with PMH of hypothyroidism, GERD who presents for preop clearance for right hand trigger finger planned for 2/20/19.

## 2019-02-13 NOTE — PHYSICAL EXAM
[No Acute Distress] : no acute distress [Well Nourished] : well nourished [Well Developed] : well developed [Well-Appearing] : well-appearing [Normal Sclera/Conjunctiva] : normal sclera/conjunctiva [Normal Outer Ear/Nose] : the outer ears and nose were normal in appearance [No JVD] : no jugular venous distention [Supple] : supple [No Respiratory Distress] : no respiratory distress  [Clear to Auscultation] : lungs were clear to auscultation bilaterally [Regular Rhythm] : with a regular rhythm [Normal S1, S2] : normal S1 and S2 [No Carotid Bruits] : no carotid bruits [No Extremity Clubbing/Cyanosis] : no extremity clubbing/cyanosis [Soft] : abdomen soft [Non Tender] : non-tender [Non-distended] : non-distended [No Spinal Tenderness] : no spinal tenderness [No Rash] : no rash [Normal Gait] : normal gait [No Focal Deficits] : no focal deficits [Normal Affect] : the affect was normal [Normal Insight/Judgement] : insight and judgment were intact [Kyphosis] : no kyphosis

## 2019-02-13 NOTE — REVIEW OF SYSTEMS
[Heartburn] : heartburn [Joint Pain] : joint pain [Joint Stiffness] : joint stiffness [Fever] : no fever [Chills] : no chills [Fatigue] : no fatigue [Discharge] : no discharge [Itching] : no itching [Earache] : no earache [Hearing Loss] : no hearing loss [Nasal Discharge] : no nasal discharge [Chest Pain] : no chest pain [Palpitations] : no palpitations [Lower Ext Edema] : no lower extremity edema [Shortness Of Breath] : no shortness of breath [Cough] : no cough [Dyspnea on Exertion] : no dyspnea on exertion [Abdominal Pain] : no abdominal pain [Nausea] : no nausea [Constipation] : no constipation [Diarrhea] : diarrhea [Vomiting] : no vomiting [Dysuria] : no dysuria [Incontinence] : no incontinence [Frequency] : no frequency [Skin Rash] : no skin rash [FreeTextEntry9] : right knee joint pain and swelling

## 2019-02-18 ENCOUNTER — RESULT REVIEW (OUTPATIENT)
Age: 54
End: 2019-02-18

## 2019-02-19 ENCOUNTER — APPOINTMENT (OUTPATIENT)
Dept: ORTHOPEDIC SURGERY | Facility: CLINIC | Age: 54
End: 2019-02-19

## 2019-02-19 ENCOUNTER — TRANSCRIPTION ENCOUNTER (OUTPATIENT)
Age: 54
End: 2019-02-19

## 2019-02-20 ENCOUNTER — APPOINTMENT (OUTPATIENT)
Dept: ORTHOPEDIC SURGERY | Facility: HOSPITAL | Age: 54
End: 2019-02-20

## 2019-02-20 ENCOUNTER — OUTPATIENT (OUTPATIENT)
Dept: OUTPATIENT SERVICES | Facility: HOSPITAL | Age: 54
LOS: 1 days | End: 2019-02-20
Payer: COMMERCIAL

## 2019-02-20 VITALS
OXYGEN SATURATION: 100 % | TEMPERATURE: 98 F | RESPIRATION RATE: 14 BRPM | SYSTOLIC BLOOD PRESSURE: 102 MMHG | HEART RATE: 64 BPM | DIASTOLIC BLOOD PRESSURE: 55 MMHG

## 2019-02-20 VITALS
TEMPERATURE: 99 F | WEIGHT: 184.09 LBS | SYSTOLIC BLOOD PRESSURE: 128 MMHG | HEART RATE: 76 BPM | HEIGHT: 66 IN | OXYGEN SATURATION: 98 % | RESPIRATION RATE: 18 BRPM | DIASTOLIC BLOOD PRESSURE: 70 MMHG

## 2019-02-20 DIAGNOSIS — Z90.49 ACQUIRED ABSENCE OF OTHER SPECIFIED PARTS OF DIGESTIVE TRACT: Chronic | ICD-10-CM

## 2019-02-20 DIAGNOSIS — Z98.890 OTHER SPECIFIED POSTPROCEDURAL STATES: Chronic | ICD-10-CM

## 2019-02-20 DIAGNOSIS — M65.332 TRIGGER FINGER, LEFT MIDDLE FINGER: ICD-10-CM

## 2019-02-20 DIAGNOSIS — M65.331 TRIGGER FINGER, RIGHT MIDDLE FINGER: ICD-10-CM

## 2019-02-20 PROCEDURE — 26055 INCISE FINGER TENDON SHEATH: CPT | Mod: F7

## 2019-02-20 PROCEDURE — 20550 NJX 1 TENDON SHEATH/LIGAMENT: CPT | Mod: F2,59

## 2019-02-20 PROCEDURE — 20550 NJX 1 TENDON SHEATH/LIGAMENT: CPT | Mod: F2

## 2019-02-20 RX ORDER — FAMOTIDINE 10 MG/ML
1 INJECTION INTRAVENOUS
Qty: 0 | Refills: 0 | COMMUNITY

## 2019-02-20 RX ORDER — MAGNESIUM CARBONATE 54 MG/5 ML
1 LIQUID (ML) ORAL
Qty: 0 | Refills: 0 | COMMUNITY

## 2019-02-20 RX ORDER — POLYETHYLENE GLYCOL 3350 17 G/17G
1 POWDER, FOR SOLUTION ORAL
Qty: 0 | Refills: 0 | COMMUNITY

## 2019-02-20 RX ORDER — CELECOXIB 200 MG/1
200 CAPSULE ORAL ONCE
Qty: 0 | Refills: 0 | Status: DISCONTINUED | OUTPATIENT
Start: 2019-02-20 | End: 2019-03-07

## 2019-02-20 RX ORDER — ACETAMINOPHEN 500 MG
1000 TABLET ORAL ONCE
Qty: 0 | Refills: 0 | Status: COMPLETED | OUTPATIENT
Start: 2019-02-20 | End: 2019-02-20

## 2019-02-20 RX ORDER — CELECOXIB 200 MG/1
200 CAPSULE ORAL ONCE
Qty: 0 | Refills: 0 | Status: COMPLETED | OUTPATIENT
Start: 2019-02-20 | End: 2019-02-20

## 2019-02-20 RX ORDER — SODIUM CHLORIDE 9 MG/ML
1000 INJECTION, SOLUTION INTRAVENOUS
Qty: 0 | Refills: 0 | Status: DISCONTINUED | OUTPATIENT
Start: 2019-02-20 | End: 2019-03-07

## 2019-02-20 RX ORDER — CHOLECALCIFEROL (VITAMIN D3) 125 MCG
1 CAPSULE ORAL
Qty: 0 | Refills: 0 | COMMUNITY

## 2019-02-20 RX ORDER — OXYCODONE HYDROCHLORIDE 5 MG/1
5 TABLET ORAL ONCE
Qty: 0 | Refills: 0 | Status: DISCONTINUED | OUTPATIENT
Start: 2019-02-20 | End: 2019-02-20

## 2019-02-20 RX ORDER — IBUPROFEN 200 MG
1 TABLET ORAL
Qty: 0 | Refills: 0 | COMMUNITY

## 2019-02-20 RX ORDER — GABAPENTIN 400 MG/1
1 CAPSULE ORAL
Qty: 0 | Refills: 0 | COMMUNITY

## 2019-02-20 RX ORDER — ACETAMINOPHEN 500 MG
2 TABLET ORAL
Qty: 0 | Refills: 0 | COMMUNITY

## 2019-02-20 RX ORDER — MELOXICAM 15 MG/1
1 TABLET ORAL
Qty: 0 | Refills: 0 | COMMUNITY

## 2019-02-20 RX ORDER — ATORVASTATIN CALCIUM 80 MG/1
1 TABLET, FILM COATED ORAL
Qty: 0 | Refills: 0 | COMMUNITY

## 2019-02-20 RX ORDER — OMEGA-3 ACID ETHYL ESTERS 1 G
1 CAPSULE ORAL
Qty: 0 | Refills: 0 | COMMUNITY

## 2019-02-20 RX ORDER — HYDROMORPHONE HYDROCHLORIDE 2 MG/ML
0.25 INJECTION INTRAMUSCULAR; INTRAVENOUS; SUBCUTANEOUS
Qty: 0 | Refills: 0 | Status: DISCONTINUED | OUTPATIENT
Start: 2019-02-20 | End: 2019-02-20

## 2019-02-20 RX ORDER — LEVOTHYROXINE SODIUM 125 MCG
1 TABLET ORAL
Qty: 0 | Refills: 0 | COMMUNITY

## 2019-02-20 RX ORDER — ONDANSETRON 8 MG/1
4 TABLET, FILM COATED ORAL ONCE
Qty: 0 | Refills: 0 | Status: DISCONTINUED | OUTPATIENT
Start: 2019-02-20 | End: 2019-03-07

## 2019-02-20 RX ADMIN — SODIUM CHLORIDE 3 MILLILITER(S): 9 INJECTION INTRAMUSCULAR; INTRAVENOUS; SUBCUTANEOUS at 12:42

## 2019-02-20 RX ADMIN — Medication 1000 MILLIGRAM(S): at 12:41

## 2019-02-20 RX ADMIN — CELECOXIB 200 MILLIGRAM(S): 200 CAPSULE ORAL at 12:42

## 2019-02-20 NOTE — ASU DISCHARGE PLAN (ADULT/PEDIATRIC). - NOTIFY
Fever greater than 101/Persistent Nausea and Vomiting/Inability to Tolerate Liquids or Foods/Unable to Urinate/Pain not relieved by Medications/Bleeding that does not stop/Swelling that continues Fever greater than 101/Persistent Nausea and Vomiting/Inability to Tolerate Liquids or Foods/Unable to Urinate/Pain not relieved by Medications/Bleeding that does not stop/Numbness, color, or temperature change to extremity/Swelling that continues

## 2019-02-20 NOTE — ASU DISCHARGE PLAN (ADULT/PEDIATRIC). - MEDICATION SUMMARY - MEDICATIONS TO TAKE
I will START or STAY ON the medications listed below when I get home from the hospital:    neocell 1 tab oral daily  -- Indication: For home    move free 1 tab oral daily  -- Indication: For home    Tylenol 325 mg oral tablet  -- 2 tab(s) by mouth every 4 hours, As Needed for pain  -- Indication: For pain    Motrin 400 mg oral tablet  -- 1 tab(s) by mouth every 6 hours, As Needed for pain  -- Indication: For pain    Horizant 300 mg oral tablet, extended release  -- 1 tab(s) by mouth 2 times a day, As Needed  -- Indication: For home    atorvastatin 10 mg oral tablet  -- 1 tab(s) by mouth once a day (at bedtime)  -- Indication: For home    Pepcid AC Maximum Strength 20 mg oral tablet  -- 1 tab(s) by mouth 2 times a day  -- Indication: For home    MiraLax oral powder for reconstitution  -- 1 dose(s) by mouth once a day (at bedtime)  -- Indication: For home    magnesium carbonate 250 mg oral capsule  -- 1 cap(s) by mouth once a day  -- Indication: For home    Fish Oil 1200 mg oral capsule  -- 1 cap(s) by mouth 3 times a day  -- Indication: For home    Synthroid 75 mcg (0.075 mg) oral tablet  -- 1 tab(s) by mouth once a day  -- Indication: For home    Vitamin D3 5000 intl units oral capsule  -- 1 cap(s) by mouth once a day  -- Indication: For home

## 2019-02-20 NOTE — PRE-ANESTHESIA EVALUATION ADULT - NSATTENDATTESTRD_GEN_ALL_CORE
Chantel Rosario is a 76year old male who presents for a complete physical exam.   HPI:   Mr. Chantel Rosario is a 51-year-old white male who was seen by me on December 12, 2018 for his Medicare annual physical examination.   At the time of examination Mr. Emmanuelle Reyes NA) by Nasal route daily as needed. Disp:  Rfl:    Fluticasone Propionate 50 MCG/ACT Nasal Suspension by Each Nare route daily as needed for Rhinitis.  Disp:  Rfl:    TURMERIC OR Turmeric Plus Ginger 1650/300mg daily  Disp:  Rfl:    TAMSULOSIN HCL 0.4 MG Or [address]   • ELECTROCARDIOGRAM, COMPLETE  11-    Scanned to media tab - DOS 11-   • OTHER SURGICAL HISTORY  2007    Laminoforaminotomy      Family History   Problem Relation Age of Onset   • Stroke Father    • Cancer Mother All peripheral pulses are intact. NEURO:Alert and oriented, CN are intact, DTRs are 1+ Bilaterally absent Achilles reflexes bilaterally. Patient did not have an EKG has he has it done with his cardiologist, Dr. Milagro Welch.   Patient's CMP was normal. Spinal stenosis of lumbar region without neurogenic claudication  Stable. CPM.    8. Peripheral polyneuropathy  Stable. CPM.  Patient is a peripheral neuropathy mainly involving his feet. This is remained stable.   CPM.    9. Elevated PSA  Patient's rece dressing or bathing?: (P) No    Problems with daily activities? : (P) No    Memory Problems?: (P) No      Fall/Risk Assessment     Do you have 3 or more medical conditions?: (P) 1-Yes    Have you fallen in the last 12 months?: (P) 0-No    Do you accidently don't speak clearly):  No People get annoyed because I misunderstand what they say:  No   I misunderstand what others are saying and make inappropriate responses:  No I avoid social activities because I cannot hear well and fear I will reply improperly:  N Ophthalmology Visit Annually Pt sees his Ophthalmologist.    Prostate Cancer Screening      PSA  Annually PSA due on 12/04/2019  Update Health Maintenance if applicable   Immunizations      Influenza No orders found for this or any previous visit.  Zandra Fong No flowsheet data found. The patient has been re-examined and I agree with the above assessment or I updated with my findings.

## 2019-02-20 NOTE — ASU PATIENT PROFILE, ADULT - PMH
H. pylori infection  2017  Hypothyroid    Rheumatoid arthritis  on no med  Rosacea    Vitamin D deficiency

## 2019-02-20 NOTE — ASU DISCHARGE PLAN (ADULT/PEDIATRIC). - FOLLOWUP APPOINTMENT CLINIC/PHYSICIAN
Please follow up with Dr. Diop within x1 week after discharge from the hospital. You may call [phone] to schedule an appointment.

## 2019-02-25 PROBLEM — L71.9 ROSACEA, UNSPECIFIED: Chronic | Status: ACTIVE | Noted: 2019-02-11

## 2019-02-25 PROBLEM — E55.9 VITAMIN D DEFICIENCY, UNSPECIFIED: Chronic | Status: ACTIVE | Noted: 2019-02-11

## 2019-02-28 ENCOUNTER — APPOINTMENT (OUTPATIENT)
Dept: ORTHOPEDIC SURGERY | Facility: CLINIC | Age: 54
End: 2019-02-28
Payer: COMMERCIAL

## 2019-02-28 VITALS
DIASTOLIC BLOOD PRESSURE: 53 MMHG | SYSTOLIC BLOOD PRESSURE: 104 MMHG | BODY MASS INDEX: 28.77 KG/M2 | HEIGHT: 66 IN | HEART RATE: 71 BPM | WEIGHT: 179 LBS

## 2019-02-28 PROCEDURE — 99213 OFFICE O/P EST LOW 20 MIN: CPT

## 2019-02-28 PROCEDURE — 73630 X-RAY EXAM OF FOOT: CPT | Mod: RT

## 2019-03-04 ENCOUNTER — APPOINTMENT (OUTPATIENT)
Dept: ORTHOPEDIC SURGERY | Facility: CLINIC | Age: 54
End: 2019-03-04
Payer: COMMERCIAL

## 2019-03-04 DIAGNOSIS — M65.332 TRIGGER FINGER, LEFT MIDDLE FINGER: ICD-10-CM

## 2019-03-04 DIAGNOSIS — M65.331 TRIGGER FINGER, RIGHT MIDDLE FINGER: ICD-10-CM

## 2019-03-04 PROCEDURE — 99024 POSTOP FOLLOW-UP VISIT: CPT

## 2019-03-04 NOTE — HISTORY OF PRESENT ILLNESS
[de-identified] : S/P Release of trigger finger right long finger, Cortisone injection in flexor tendon sheath of left long finger; date of surgery 2/20/19.  [de-identified] : 52y/o female presents s/p Release of trigger finger right long finger, Cortisone injection in flexor tendon sheath of left long finger; date of surgery 2/20/19. \par Patient has been doing home exercise thing. Patient has no triggering.\par The patient is regaining virtually full flexion. Small PIP contracture.\par Left hand: Long finger is pain free and not triggering. [de-identified] : Right handWound is healed, clean and dry. No redness or sign of infection. Sutures removed. Minimal swelling, minimal ecchymosis.\par Full passive flexion. Active flexion virtually full. No triggering.\par PIP extension -10° actively. Passive 0°, but with pain.\par No additional pertinent positive contributory findings.\par Normal. Sensory exam\par Left hand: Long finger, full flexion, extension without triggering.No additional pertinent positive contributory findings. [de-identified] : Pt has excellent early result on the right after long finger trigger release. Full active flexion. Active extension nearly full.\par A home exercise program demonstrated, explained, probably executed by patient. Small PIP contracture may persist.\par Left long trigger finger has subsided following cortisone injection. This will be monitored.\par \par The patient has right volar ulnar wrist click with pronation, supination, that is occasionally painful. This is recent onset. Patient does not recall specific trauma. [de-identified] : 1. Patient doing well after right long trigger release.Postop instructions including activities, use of hand/wrist, wound care, limitations and expectations have been explained, discussed, and reviewed with the patient. \par The patient should be cautious in activities for one to two weeks.  Thereafter, the patient should increase to full use as pain permits.  If the patient is comfortable and doing well, then the patient should return p.r.n.  If there are questions or problems, the patient should call, or will be seen as needed.  All questions were answered.  The patient has expressed acceptance and understanding of analysis, treatment, and recommendations.\par 2. Left long trigger finger inactive after cortisone injection. Patient to return of triggering recurs.\par 3. Patient describes right volar ulnar wrist pain with associated click of recent onset. If this continues to be problematic, patient should return for reevaluation and treatment.\par 4. Patient will be traveling to [place] for extended holiday. Patient should return for evaluation and treatment upon return if she has questions or concerns.\par Otherwise, return p.r.n.

## 2019-03-05 ENCOUNTER — APPOINTMENT (OUTPATIENT)
Dept: RHEUMATOLOGY | Facility: CLINIC | Age: 54
End: 2019-03-05
Payer: COMMERCIAL

## 2019-03-05 VITALS
BODY MASS INDEX: 28.77 KG/M2 | DIASTOLIC BLOOD PRESSURE: 60 MMHG | SYSTOLIC BLOOD PRESSURE: 90 MMHG | OXYGEN SATURATION: 97 % | HEIGHT: 66 IN | WEIGHT: 179 LBS | HEART RATE: 68 BPM

## 2019-03-05 DIAGNOSIS — K21.9 GASTRO-ESOPHAGEAL REFLUX DISEASE W/OUT ESOPHAGITIS: ICD-10-CM

## 2019-03-05 PROCEDURE — 99214 OFFICE O/P EST MOD 30 MIN: CPT

## 2019-03-05 RX ORDER — ACETAZOLAMIDE 125 MG/1
125 TABLET ORAL TWICE DAILY
Qty: 34 | Refills: 0 | Status: DISCONTINUED | COMMUNITY
Start: 2019-02-12 | End: 2019-03-05

## 2019-03-05 NOTE — HISTORY OF PRESENT ILLNESS
[FreeTextEntry1] : 3/5/19\par - not seen for nearly 3 years... still working long hours and under stress\par - similar complaints... diffuse joint pain and limited ROM and stiffness minimal but struggles w/ mobility- getting on floor, bending\par - diffuse mild paresthesias\par - trigger fingers surgical revision 3/19 R and L side with similar issues... \par - CT release b/L  hand  & R  with good response  \par - suffers chronic contstipation on miralax routinely works routinely w/ GI... lactose intolerance \par - recent trial of meloxicam well tolerated and felt great- no worsening of GI distress, originally taken for R knee injury but all joints better.\par - rosacea on face improved w/ decreased stress.. \par \par \par \par 1) Positive HELADIO:  with hx Rosacea ++ photosensitive, not involving nasolabial folds as would be seen in SLE but  but also fully responsive to metrogel. Need full serologies for SLE now, certainly current stress could trigger any number of autoimmune conditions.  Exam + for tenderness throughout small joints c/w symmetrical polyarthritis that could be seen in SLE/ RA.  No treatment at this time without better understanding.  \par \par 2) CTS:  b/l Currently planning on CT release b/l - has refused to wear splints (doesn't sleep more than 2 hours/ 24 hs and won't consider daytime use.  Normal strength.  Has she tried injections? \par \par 3) Sleep disorder:  works 3 jobs, one through night, doesn't sleep more than 2 hs/ day for past 10 ys.  If serologies are negative, needs attention to sleep, all symptoms described except the rash could be r/t severe sleep deprivation.  Emphasized this at length today, as has her primary care.  \par _______________________________________________________________________________\par \par \par \par (Initial hpi 16) \par 49 yo w/ + HELADIO w/ 2 + years of joint pain "whole body" with significant stiffness after sitting for any extended period of time- difficulty w/ stairs up worse, + gel- no overt swelling.  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

## 2019-03-05 NOTE — REVIEW OF SYSTEMS
[As Noted in HPI] : as noted in HPI [Arthralgias] : arthralgias [Joint Pain] : joint pain [Joint Stiffness] : joint stiffness [Negative] : Heme/Lymph [FreeTextEntry2] : energy level improved... doing well w/ rest and better sleep. Not working at this time [FreeTextEntry9] : recurrent R knee pain/ swelling following blunt trauma, improved but still present. MRI pending  [de-identified] : hair thinning now on biotene...  [de-identified] : minimal paresthesias

## 2019-03-05 NOTE — PHYSICAL EXAM
[General Appearance - Alert] : alert [General Appearance - In No Acute Distress] : in no acute distress [Sclera] : the sclera and conjunctiva were normal [PERRL With Normal Accommodation] : pupils were equal in size, round, and reactive to light [Extraocular Movements] : extraocular movements were intact [Outer Ear] : the ears and nose were normal in appearance [Oropharynx] : the oropharynx was normal [] : no respiratory distress [Auscultation Breath Sounds / Voice Sounds] : lungs were clear to auscultation bilaterally [Heart Rate And Rhythm] : heart rate was normal and rhythm regular [Heart Sounds] : normal S1 and S2 [Heart Sounds Gallop] : no gallops [Murmurs] : no murmurs [Heart Sounds Pericardial Friction Rub] : no pericardial rub [Full Pulse] : the pedal pulses are present [Edema] : there was no peripheral edema [Cervical Lymph Nodes Enlarged Posterior Bilaterally] : posterior cervical [Cervical Lymph Nodes Enlarged Anterior Bilaterally] : anterior cervical [Supraclavicular Lymph Nodes Enlarged Bilaterally] : supraclavicular [No CVA Tenderness] : no ~M costovertebral angle tenderness [No Spinal Tenderness] : no spinal tenderness [Skin Color & Pigmentation] : normal skin color and pigmentation [Skin Turgor] : normal skin turgor [Deep Tendon Reflexes (DTR)] : deep tendon reflexes were 2+ and symmetric [Sensation] : the sensory exam was normal to light touch and pinprick [No Focal Deficits] : no focal deficits [Oriented To Time, Place, And Person] : oriented to person, place, and time [Impaired Insight] : insight and judgment were intact [Affect] : the affect was normal [General Appearance - Well Nourished] : well nourished [General Appearance - Well Developed] : well developed [General Appearance - Well-Appearing] : healthy appearing [FreeTextEntry1] :  str nl  [Over the Past 2 Weeks, Have You Felt Down, Depressed, or Hopeless?] : 1.) Over the past 2 weeks, have you felt down, depressed, or hopeless? No [Over the Past 2 Weeks, Have You Felt Little Interest or Pleasure Doing Things?] : 2.) Over the past 2 weeks, have you felt little interest or pleasure doing things? No

## 2019-03-05 NOTE — ASSESSMENT
[FreeTextEntry1] : 52 yo w/ 2 ys hx diffuse arthralgias/ myalgias and paresthesias w/u + HELADIO w/ neg subserologies except autoimmune thyroid dz.\par CTS b/l release\par Trigger fingers b/l \par \par 1) Positive HELADIO 1:640 H:  with hx Rosacea ++ photosensitive, not involving nasolabial folds as would be seen in SLE full serologies neg except TPO 93.  All other serologies to include tTG neg w/ nl ESR/ CRP, C3/4  but  but also fully responsive to metrogel.  Diffuse arthralgias / myalgias persist but much improved, no arthropathy- overall well controlled on low dose gabapentin and felt great w/ NSAIDs... meloxicam 15 mg daily.  Ok to continue as needed but needs to keep to lower dose 7/.5 mg daily only 15 mg for rare occas.  Reportedly recent mild renal dysfunction, updated labs ordered, if any elevation will need to keep to 7.5 mg only.  \par , still denies sicca, raynauds, mucositis, serositis.  Overall energy level improved past few ys especially with improved sleep.  \par Exam continues to be unremarkable.  .  \par \par 2) CTS: repaired w/ no return... b/l \par \par 3) Sleep disorder:  better w/ change in jobs.  \par \par 4) Internal derangement R knee:  effusion, better w/ meloxicam... and pt \par \par Plan:\par - Rx for Horizant from neuro- just long acting gabapentin continues w/ good control of paresthesais, not active issue now\par - meloxicam 7.5 mg daily, only 15 mg infrequently... labs pending... will need close f/u \par - R knee MRI pending, need to review\par - consider taking Horizant every day before you go to sleep. This will help with deeper sleep and pain\par - rto 4 m

## 2019-03-07 LAB
ALBUMIN SERPL ELPH-MCNC: 4.5 G/DL
ALP BLD-CCNC: 77 U/L
ALT SERPL-CCNC: 37 U/L
ANION GAP SERPL CALC-SCNC: 13 MMOL/L
AST SERPL-CCNC: 24 U/L
BASOPHILS # BLD AUTO: 0.03 K/UL
BASOPHILS NFR BLD AUTO: 0.4 %
BILIRUB SERPL-MCNC: 0.3 MG/DL
BUN SERPL-MCNC: 13 MG/DL
C3 SERPL-MCNC: 132 MG/DL
C4 SERPL-MCNC: 35 MG/DL
CALCIUM SERPL-MCNC: 9.8 MG/DL
CHLORIDE SERPL-SCNC: 106 MMOL/L
CK SERPL-CCNC: 82 U/L
CO2 SERPL-SCNC: 25 MMOL/L
CREAT SERPL-MCNC: 0.62 MG/DL
CRP SERPL-MCNC: <0.1 MG/DL
DSDNA AB SER-ACNC: 21 IU/ML
ENA RNP AB SER IA-ACNC: 0.3 AL
ENA SM AB SER IA-ACNC: <0.2 AL
ENA SS-A AB SER IA-ACNC: <0.2 AL
ENA SS-B AB SER IA-ACNC: <0.2 AL
EOSINOPHIL # BLD AUTO: 0.18 K/UL
EOSINOPHIL NFR BLD AUTO: 2.7 %
ERYTHROCYTE [SEDIMENTATION RATE] IN BLOOD BY WESTERGREN METHOD: 10 MM/HR
GLUCOSE SERPL-MCNC: 96 MG/DL
HCT VFR BLD CALC: 38.2 %
HGB BLD-MCNC: 12.5 G/DL
IMM GRANULOCYTES NFR BLD AUTO: 0 %
LYMPHOCYTES # BLD AUTO: 2.94 K/UL
LYMPHOCYTES NFR BLD AUTO: 43.5 %
MAN DIFF?: NORMAL
MCHC RBC-ENTMCNC: 29.6 PG
MCHC RBC-ENTMCNC: 32.7 GM/DL
MCV RBC AUTO: 90.5 FL
MONOCYTES # BLD AUTO: 0.34 K/UL
MONOCYTES NFR BLD AUTO: 5 %
NEUTROPHILS # BLD AUTO: 3.27 K/UL
NEUTROPHILS NFR BLD AUTO: 48.4 %
PLATELET # BLD AUTO: 256 K/UL
POTASSIUM SERPL-SCNC: 3.8 MMOL/L
PROT SERPL-MCNC: 7.1 G/DL
RBC # BLD: 4.22 M/UL
RBC # FLD: 13.5 %
SODIUM SERPL-SCNC: 144 MMOL/L
WBC # FLD AUTO: 6.76 K/UL

## 2019-03-11 LAB
ANA PAT FLD IF-IMP: ABNORMAL
ANA SER IF-ACNC: ABNORMAL

## 2019-03-25 ENCOUNTER — APPOINTMENT (OUTPATIENT)
Dept: ORTHOPEDIC SURGERY | Facility: CLINIC | Age: 54
End: 2019-03-25
Payer: COMMERCIAL

## 2019-03-25 ENCOUNTER — APPOINTMENT (OUTPATIENT)
Dept: DERMATOLOGY | Facility: CLINIC | Age: 54
End: 2019-03-25
Payer: COMMERCIAL

## 2019-03-25 VITALS
DIASTOLIC BLOOD PRESSURE: 68 MMHG | WEIGHT: 170 LBS | HEIGHT: 66 IN | BODY MASS INDEX: 27.32 KG/M2 | SYSTOLIC BLOOD PRESSURE: 104 MMHG | HEART RATE: 67 BPM

## 2019-03-25 VITALS
WEIGHT: 170 LBS | BODY MASS INDEX: 27.32 KG/M2 | DIASTOLIC BLOOD PRESSURE: 78 MMHG | SYSTOLIC BLOOD PRESSURE: 110 MMHG | HEIGHT: 66 IN

## 2019-03-25 DIAGNOSIS — Z91.89 OTHER SPECIFIED PERSONAL RISK FACTORS, NOT ELSEWHERE CLASSIFIED: ICD-10-CM

## 2019-03-25 DIAGNOSIS — Z87.2 PERSONAL HISTORY OF DISEASES OF THE SKIN AND SUBCUTANEOUS TISSUE: ICD-10-CM

## 2019-03-25 DIAGNOSIS — L30.9 DERMATITIS, UNSPECIFIED: ICD-10-CM

## 2019-03-25 DIAGNOSIS — Z84.0 FAMILY HISTORY OF DISEASES OF THE SKIN AND SUBCUTANEOUS TISSUE: ICD-10-CM

## 2019-03-25 DIAGNOSIS — S92.344D NONDISPLACED FRACTURE OF FOURTH METATARSAL BONE, RIGHT FOOT, SUBSEQUENT ENCOUNTER FOR FRACTURE WITH ROUTINE HEALING: ICD-10-CM

## 2019-03-25 DIAGNOSIS — L71.9 ROSACEA, UNSPECIFIED: ICD-10-CM

## 2019-03-25 PROCEDURE — 99213 OFFICE O/P EST LOW 20 MIN: CPT

## 2019-03-25 PROCEDURE — 99243 OFF/OP CNSLTJ NEW/EST LOW 30: CPT | Mod: GC

## 2019-03-25 PROCEDURE — 73630 X-RAY EXAM OF FOOT: CPT | Mod: RT

## 2019-03-28 ENCOUNTER — APPOINTMENT (OUTPATIENT)
Dept: FAMILY MEDICINE | Facility: CLINIC | Age: 54
End: 2019-03-28
Payer: COMMERCIAL

## 2019-03-28 VITALS — SYSTOLIC BLOOD PRESSURE: 110 MMHG | RESPIRATION RATE: 13 BRPM | DIASTOLIC BLOOD PRESSURE: 70 MMHG

## 2019-03-28 DIAGNOSIS — A09 INFECTIOUS GASTROENTERITIS AND COLITIS, UNSPECIFIED: ICD-10-CM

## 2019-03-28 PROCEDURE — 99213 OFFICE O/P EST LOW 20 MIN: CPT

## 2019-03-28 NOTE — HISTORY OF PRESENT ILLNESS
[FreeTextEntry8] : Pt returned from [place] 6 days ago. While she was in [place] she developed one week of fever, abdominal pain, and diarrhea. She got an antibiotic while she was there but does not remember the name. When she first developed the diarrhea she did not have blood in the stool, however three days later she did develop blood. She has been bleeding for about 9 days. She has a remote history of hemorrhoids which were banded.

## 2019-03-28 NOTE — PHYSICAL EXAM

## 2019-04-02 NOTE — ASU DISCHARGE PLAN (ADULT/PEDIATRIC). - "IF YOU OR YOUR GUARDIAN/FAMILY IS A SMOKER, IT IS IMPORTANT FOR YOUR HEALTH TO STOP SMOKING. PLEASE BE AWARE THAT SECOND HAND SMOKE IS ALSO HARMFUL."
Quality 431: Preventive Care And Screening: Unhealthy Alcohol Use - Screening: Patient screened for unhealthy alcohol use using a single question and scores less than 2 times per year Detail Level: Detailed Quality 226: Preventive Care And Screening: Tobacco Use: Screening And Cessation Intervention: Tobacco Screening not Performed for Medical Reasons Quality 130: Documentation Of Current Medications In The Medical Record: Current Medications Documented Statement Selected

## 2019-04-04 LAB
ALBUMIN SERPL ELPH-MCNC: 4.4 G/DL
ALP BLD-CCNC: 84 U/L
ALT SERPL-CCNC: 24 U/L
ANION GAP SERPL CALC-SCNC: 12 MMOL/L
AST SERPL-CCNC: 23 U/L
BASOPHILS # BLD AUTO: 0.02 K/UL
BASOPHILS NFR BLD AUTO: 0.3 %
BILIRUB SERPL-MCNC: <0.2 MG/DL
BUN SERPL-MCNC: 16 MG/DL
CALCIUM SERPL-MCNC: 9.6 MG/DL
CHLORIDE SERPL-SCNC: 104 MMOL/L
CO2 SERPL-SCNC: 26 MMOL/L
CREAT SERPL-MCNC: 0.68 MG/DL
EOSINOPHIL # BLD AUTO: 0.1 K/UL
EOSINOPHIL NFR BLD AUTO: 1.6 %
GLUCOSE SERPL-MCNC: 90 MG/DL
HAV IGM SER QL: NONREACTIVE
HBV CORE IGM SER QL: NONREACTIVE
HBV SURFACE AG SER QL: NONREACTIVE
HCT VFR BLD CALC: 36.9 %
HCV AB SER QL: NONREACTIVE
HCV S/CO RATIO: 0.08 S/CO
HGB BLD-MCNC: 12 G/DL
IMM GRANULOCYTES NFR BLD AUTO: 0.2 %
LYMPHOCYTES # BLD AUTO: 2.37 K/UL
LYMPHOCYTES NFR BLD AUTO: 38.3 %
MAN DIFF?: NORMAL
MCHC RBC-ENTMCNC: 30.1 PG
MCHC RBC-ENTMCNC: 32.5 GM/DL
MCV RBC AUTO: 92.5 FL
MONOCYTES # BLD AUTO: 0.38 K/UL
MONOCYTES NFR BLD AUTO: 6.1 %
NEUTROPHILS # BLD AUTO: 3.3 K/UL
NEUTROPHILS NFR BLD AUTO: 53.5 %
PLATELET # BLD AUTO: 293 K/UL
POTASSIUM SERPL-SCNC: 4.1 MMOL/L
PROT SERPL-MCNC: 7.2 G/DL
RBC # BLD: 3.99 M/UL
RBC # FLD: 14.6 %
SODIUM SERPL-SCNC: 142 MMOL/L
WBC # FLD AUTO: 6.18 K/UL

## 2019-04-08 ENCOUNTER — APPOINTMENT (OUTPATIENT)
Dept: SURGERY | Facility: CLINIC | Age: 54
End: 2019-04-08
Payer: COMMERCIAL

## 2019-04-08 VITALS
TEMPERATURE: 98.1 F | HEIGHT: 66 IN | DIASTOLIC BLOOD PRESSURE: 76 MMHG | OXYGEN SATURATION: 99 % | HEART RATE: 72 BPM | BODY MASS INDEX: 25.71 KG/M2 | SYSTOLIC BLOOD PRESSURE: 120 MMHG | WEIGHT: 160 LBS | RESPIRATION RATE: 16 BRPM

## 2019-04-08 DIAGNOSIS — K64.1 SECOND DEGREE HEMORRHOIDS: ICD-10-CM

## 2019-04-08 PROCEDURE — 46221 LIGATION OF HEMORRHOID(S): CPT

## 2019-04-08 PROCEDURE — 99213 OFFICE O/P EST LOW 20 MIN: CPT | Mod: 25

## 2019-04-08 RX ORDER — TRIAMCINOLONE ACETONIDE 1 MG/G
0.1 OINTMENT TOPICAL
Qty: 1 | Refills: 0 | Status: DISCONTINUED | COMMUNITY
Start: 2019-03-25 | End: 2019-04-08

## 2019-04-08 RX ORDER — METRONIDAZOLE 10 MG/G
1 GEL TOPICAL
Qty: 1 | Refills: 3 | Status: DISCONTINUED | COMMUNITY
Start: 2019-03-25 | End: 2019-04-08

## 2019-04-08 RX ORDER — POLYETHYLENE GLYCOL 3350 17 G/17G
17 POWDER, FOR SOLUTION ORAL
Qty: 1 | Refills: 3 | Status: DISCONTINUED | COMMUNITY
Start: 2019-03-05 | End: 2019-04-08

## 2019-04-08 RX ORDER — SULFACETAMIDE SODIUM, SULFUR 100; 50 MG/G; MG/G
10-5 EMULSION TOPICAL
Qty: 1 | Refills: 3 | Status: DISCONTINUED | COMMUNITY
Start: 2019-03-25 | End: 2019-04-08

## 2019-04-08 RX ORDER — AZITHROMYCIN 500 MG/1
500 TABLET, FILM COATED ORAL DAILY
Qty: 1 | Refills: 0 | Status: DISCONTINUED | COMMUNITY
Start: 2019-03-28 | End: 2019-04-08

## 2019-04-08 RX ORDER — GABAPENTIN ENACARBIL 300 MG/1
300 TABLET, EXTENDED RELEASE ORAL
Qty: 30 | Refills: 0 | Status: DISCONTINUED | COMMUNITY
Start: 2019-03-05 | End: 2019-04-08

## 2019-04-08 RX ORDER — GLY/DIMETH/PETROLAT,WHT/WATER
CREAM (GRAM) TOPICAL
Qty: 1 | Refills: 3 | Status: DISCONTINUED | COMMUNITY
Start: 2019-03-25 | End: 2019-04-08

## 2019-04-15 ENCOUNTER — RX RENEWAL (OUTPATIENT)
Age: 54
End: 2019-04-15

## 2019-04-25 PROBLEM — K64.1 SECOND DEGREE HEMORRHOIDS: Status: ACTIVE | Noted: 2017-04-24

## 2019-04-25 NOTE — CONSULT LETTER
[Please see my note below.] : Please see my note below. [Courtesy Letter:] : I had the pleasure of seeing your patient, [unfilled], in my office today. [Sincerely,] : Sincerely, [Dear  ___] : Dear  [unfilled], [FreeTextEntry2] : HELENA Davies  [FreeTextEntry1] : I am sending a quick note your way to update you. Please do not hesitate to contact me with any questions.\par \par \par  [FreeTextEntry3] : Natalia Foss M.D., F.A.C.S., F.VIPUL.S.C.R.S.\par Assistant Professor of Surgery\par [employer] [facility]\par \par

## 2019-04-25 NOTE — PHYSICAL EXAM
[FreeTextEntry1] : This is a 53-year-old well-developed female in no apparent distress.\par \par Examination of the perineum reveals no external hemorrhoids. Digital rectal examination reveals normal sphincter tone. \par Anoscopy reveals moderately enlarged and inflamed three-quadrant internal hemorrhoids.\par \par

## 2019-04-25 NOTE — ASSESSMENT
[FreeTextEntry1] : 52 yo female with hemorrhoidal disease, treated with a session of RBL today.\par Instructions given.\par RTO in 3 weeks for next session of RBL.\par

## 2019-04-25 NOTE — HISTORY OF PRESENT ILLNESS
[FreeTextEntry1] : Briana is a 52 y/o female here with c/o BRBPR for the last month. \par The pt has h/o hemorrhoidal disease with rectal bleeding and I treated her with 2 sessions of RBL in 2017 - she reports her symptoms resolved after. However, one month ago she was traveling [place] the [place] and developed diarrhea, fever, then BRBPR. PMD prescribed Azithromycin. Her diarrhea has resolved, but she continues with BRBPR in the bowl every day with the BMs. \par Last colonoscopy in April 2017, on the chart.\par \par

## 2019-04-25 NOTE — PROCEDURE
[FreeTextEntry1] : I recommended rubber band ligation, explained details, risks, benefits, alternatives. Patient wished to proceed. \par Under anoscopy, the right posterolateral internal hemorrhoid was rubber banded above the dentate line. The patient tolerated the procedure well.\par \par

## 2019-04-28 ENCOUNTER — RX RENEWAL (OUTPATIENT)
Age: 54
End: 2019-04-28

## 2019-04-29 ENCOUNTER — APPOINTMENT (OUTPATIENT)
Dept: SURGERY | Facility: CLINIC | Age: 54
End: 2019-04-29

## 2019-04-30 ENCOUNTER — APPOINTMENT (OUTPATIENT)
Dept: UROGYNECOLOGY | Facility: CLINIC | Age: 54
End: 2019-04-30
Payer: COMMERCIAL

## 2019-04-30 VITALS
DIASTOLIC BLOOD PRESSURE: 71 MMHG | BODY MASS INDEX: 28.28 KG/M2 | WEIGHT: 176 LBS | HEART RATE: 61 BPM | RESPIRATION RATE: 16 BRPM | SYSTOLIC BLOOD PRESSURE: 111 MMHG | HEIGHT: 66 IN

## 2019-04-30 DIAGNOSIS — N81.11 CYSTOCELE, MIDLINE: ICD-10-CM

## 2019-04-30 DIAGNOSIS — R35.1 NOCTURIA: ICD-10-CM

## 2019-04-30 DIAGNOSIS — N36.41 HYPERMOBILITY OF URETHRA: ICD-10-CM

## 2019-04-30 DIAGNOSIS — N39.3 STRESS INCONTINENCE (FEMALE) (MALE): ICD-10-CM

## 2019-04-30 LAB
BILIRUB UR QL STRIP: NORMAL
CLARITY UR: CLEAR
COLLECTION METHOD: NORMAL
GLUCOSE UR-MCNC: NORMAL
HCG UR QL: 0.2 EU/DL
HGB UR QL STRIP.AUTO: NORMAL
KETONES UR-MCNC: NORMAL
LEUKOCYTE ESTERASE UR QL STRIP: NORMAL
NITRITE UR QL STRIP: NORMAL
PH UR STRIP: 5
PROT UR STRIP-MCNC: NORMAL
SP GR UR STRIP: 1

## 2019-04-30 PROCEDURE — 99244 OFF/OP CNSLTJ NEW/EST MOD 40: CPT | Mod: 25

## 2019-04-30 PROCEDURE — 51701 INSERT BLADDER CATHETER: CPT

## 2019-04-30 NOTE — PROCEDURE
[Fluid Management] : fluid management [FreeTextEntry1] : Sterile straight catheterization was performed to measure a postvoid residual volume which was 30 cc

## 2019-04-30 NOTE — DISCUSSION/SUMMARY
[FreeTextEntry1] : Computer generated images were used to demonstrate stress urinary incontinence and treatment options. We reviewed management options for stress urinary incontinence including: observation, pelvic floor exercises, continence devices, periurethral bulking agents, imipramine, and surgical management. We discussed surgical management options including a midurethral sling, hardy colposuspension and pubovaginal sling using native tissue. Written information on stress urinary incontinence including management options from [facility] was provided to her and reviewed. She is interested in surgery and therefore I recommend urodynamic testing, as the diagnosis is unclear based on simple testing. She will RTO for URD and follow up with me to discuss results and management options further.

## 2019-04-30 NOTE — PHYSICAL EXAM
[No Acute Distress] : in no acute distress [Oriented x3] : oriented to person, place, and time [Normal Memory] : ~T memory was ~L unimpaired [Normal Mood/Affect] : mood and affect are normal [Warm and Dry] : was warm and dry to touch [Normal Gait] : gait was normal [Labia Majora] : were normal [Labia Minora] : were normal [Normal Appearance] : general appearance was normal [Normal] : no abnormalities [Rest Stand Full Reduced ____ degree] : Q-Tip Test: Rest Stand Full Reduced [unfilled] degree [Aa ____] : Aa [unfilled] [Ba ____] : Ba [unfilled] [Exam Deferred] : was deferred [Tenderness] : ~T no ~M abdominal tenderness observed [Distended] : not distended [H/Smegaly] : no hepatosplenomegaly [Inguinal LAD] : no adenopathy was noted in the inguinal lymph nodes [de-identified] : valsalva stress test: negative

## 2019-04-30 NOTE — HISTORY OF PRESENT ILLNESS
[Constipation Obstructed Defecation] : none [Unable To Restrain Bowel Movement] : none [Urinary Frequency] : none [Feelings Of Urinary Urgency] : none [x2] : two times a night [Urinary Tract Infection] : rare [] : years ago [Stool Visible Blood] : none [Incomplete Emptying Of Stool] : none [de-identified] : uses 1 pad daily [FreeTextEntry6] : takes miralax daily [FreeTextEntry1] : \par \par works as nurse aid

## 2019-05-01 ENCOUNTER — RESULT REVIEW (OUTPATIENT)
Age: 54
End: 2019-05-01

## 2019-05-01 LAB
APPEARANCE: CLEAR
BACTERIA: NEGATIVE
BILIRUBIN URINE: NEGATIVE
BLOOD URINE: NEGATIVE
COLOR: COLORLESS
GLUCOSE QUALITATIVE U: NEGATIVE
HYALINE CASTS: 0 /LPF
KETONES URINE: NEGATIVE
LEUKOCYTE ESTERASE URINE: NEGATIVE
MICROSCOPIC-UA: NORMAL
NITRITE URINE: NEGATIVE
PH URINE: 6
PROTEIN URINE: NEGATIVE
RED BLOOD CELLS URINE: 0 /HPF
SPECIFIC GRAVITY URINE: 1.01
SQUAMOUS EPITHELIAL CELLS: 0 /HPF
UROBILINOGEN URINE: NORMAL
WHITE BLOOD CELLS URINE: 0 /HPF

## 2019-05-02 ENCOUNTER — RESULT REVIEW (OUTPATIENT)
Age: 54
End: 2019-05-02

## 2019-05-02 LAB — BACTERIA UR CULT: NORMAL

## 2019-06-03 ENCOUNTER — APPOINTMENT (OUTPATIENT)
Dept: UROGYNECOLOGY | Facility: CLINIC | Age: 54
End: 2019-06-03

## 2019-06-10 ENCOUNTER — APPOINTMENT (OUTPATIENT)
Dept: UROGYNECOLOGY | Facility: CLINIC | Age: 54
End: 2019-06-10

## 2019-07-09 ENCOUNTER — APPOINTMENT (OUTPATIENT)
Dept: RHEUMATOLOGY | Facility: CLINIC | Age: 54
End: 2019-07-09

## 2019-07-10 ENCOUNTER — RX RENEWAL (OUTPATIENT)
Age: 54
End: 2019-07-10

## 2019-09-27 NOTE — H&P PST ADULT - HEIGHT IN INCHES
Left a message for Rhett to call the clinic to schedule a appointment. Please help the patient schedule for Medication appointment. Was to follow up 8/2019, over due for follow up. Sofia Siegel,      6

## 2020-02-02 ENCOUNTER — FORM ENCOUNTER (OUTPATIENT)
Age: 55
End: 2020-02-02

## 2020-02-03 ENCOUNTER — APPOINTMENT (OUTPATIENT)
Dept: FAMILY MEDICINE | Facility: CLINIC | Age: 55
End: 2020-02-03
Payer: COMMERCIAL

## 2020-02-03 ENCOUNTER — OUTPATIENT (OUTPATIENT)
Dept: OUTPATIENT SERVICES | Facility: HOSPITAL | Age: 55
LOS: 1 days | End: 2020-02-03
Payer: COMMERCIAL

## 2020-02-03 ENCOUNTER — APPOINTMENT (OUTPATIENT)
Dept: ULTRASOUND IMAGING | Facility: CLINIC | Age: 55
End: 2020-02-03

## 2020-02-03 VITALS
HEART RATE: 64 BPM | OXYGEN SATURATION: 98 % | RESPIRATION RATE: 16 BRPM | TEMPERATURE: 98.4 F | WEIGHT: 160 LBS | DIASTOLIC BLOOD PRESSURE: 64 MMHG | HEIGHT: 66 IN | SYSTOLIC BLOOD PRESSURE: 108 MMHG | BODY MASS INDEX: 25.71 KG/M2

## 2020-02-03 DIAGNOSIS — Z98.890 OTHER SPECIFIED POSTPROCEDURAL STATES: Chronic | ICD-10-CM

## 2020-02-03 DIAGNOSIS — K52.9 NONINFECTIVE GASTROENTERITIS AND COLITIS, UNSPECIFIED: ICD-10-CM

## 2020-02-03 DIAGNOSIS — Z90.49 ACQUIRED ABSENCE OF OTHER SPECIFIED PARTS OF DIGESTIVE TRACT: Chronic | ICD-10-CM

## 2020-02-03 PROCEDURE — 76705 ECHO EXAM OF ABDOMEN: CPT | Mod: 26

## 2020-02-03 PROCEDURE — 99213 OFFICE O/P EST LOW 20 MIN: CPT

## 2020-02-03 PROCEDURE — 76705 ECHO EXAM OF ABDOMEN: CPT

## 2020-02-03 RX ORDER — AZITHROMYCIN 250 MG/1
250 TABLET, FILM COATED ORAL
Qty: 6 | Refills: 0 | Status: COMPLETED | COMMUNITY
Start: 2019-12-22

## 2020-02-03 NOTE — HISTORY OF PRESENT ILLNESS
[FreeTextEntry8] : 55y/o F presents with diarrhea. Patient states that she was out to dinner on Friday night with  to a buffet. Shortly after returning home, she developed chills and body aches, later developed diarrhea and abdominal pain. Denies vomiting. No blood in diarrhea. Went to urgent care on Saturday (2/1/2020), diagnosed with diverticulitis. Was given a 10 day course of Cipro/Flagyl and has been on a clear diet. Having worsening right and left sided mid-abdominal pain, some pain relief with Tylenol. Pain not exacerbated by clear liquid diet. Last episode of diarrhea at 3am this morning. Patient had cold symptoms in early January and finished a course of antibiotics at this time. Does not remember which antibiotic. \par \par Last colonoscopy about 2 years ago, did not show diverticulosis.

## 2020-02-03 NOTE — REVIEW OF SYSTEMS
[Abdominal Pain] : abdominal pain [Diarrhea] : diarrhea [Headache] : headache [Fever] : no fever [Chills] : no chills [Pain] : no pain [Vision Problems] : no vision problems [Earache] : no earache [Nasal Discharge] : no nasal discharge [Sore Throat] : no sore throat [Palpitations] : no palpitations [Chest Pain] : no chest pain [Lower Ext Edema] : no lower extremity edema [Shortness Of Breath] : no shortness of breath [Wheezing] : no wheezing [Cough] : no cough [Nausea] : no nausea [Vomiting] : no vomiting [Dysuria] : no dysuria [Hematuria] : no hematuria [Joint Pain] : no joint pain [Muscle Pain] : no muscle pain [Itching] : no itching [Skin Rash] : no skin rash [Dizziness] : no dizziness [Anxiety] : no anxiety [Depression] : no depression

## 2020-02-03 NOTE — PLAN
[FreeTextEntry1] : 1. Diarrhea\par - Unclear etiology at this time, gastroenteritis versus c dif versus cholecystitis,diagnosed with diverticulitis at urgent care, although this is unlikely given no evidence of diverticulosis on colonoscopy \par - Advised to stop cipro/flagyl\par - If RUQ sono negative and diarrhea continues check stool PCR for c dif given recent antibiotic use\par - No need for CT abd/pelv at this time given improving symptoms \par

## 2020-02-03 NOTE — PHYSICAL EXAM
[No Acute Distress] : no acute distress [Well Nourished] : well nourished [Well Developed] : well developed [Well-Appearing] : well-appearing [Normal Sclera/Conjunctiva] : normal sclera/conjunctiva [Normal Outer Ear/Nose] : the outer ears and nose were normal in appearance [No JVD] : no jugular venous distention [No Respiratory Distress] : no respiratory distress  [No Accessory Muscle Use] : no accessory muscle use [Clear to Auscultation] : lungs were clear to auscultation bilaterally [Normal Rate] : normal rate  [Regular Rhythm] : with a regular rhythm [Normal S1, S2] : normal S1 and S2 [No Edema] : there was no peripheral edema [Soft] : abdomen soft [Normal Bowel Sounds] : normal bowel sounds [Non-distended] : non-distended [No Focal Deficits] : no focal deficits [Normal Gait] : normal gait [de-identified] : tenderness to palpation of mid-abdomen b/l, no rebound, tenderness, no guarding. +Colón's sign.

## 2020-02-20 ENCOUNTER — OUTPATIENT (OUTPATIENT)
Dept: OUTPATIENT SERVICES | Facility: HOSPITAL | Age: 55
LOS: 1 days | End: 2020-02-20
Payer: COMMERCIAL

## 2020-02-20 ENCOUNTER — APPOINTMENT (OUTPATIENT)
Dept: CT IMAGING | Facility: CLINIC | Age: 55
End: 2020-02-20
Payer: COMMERCIAL

## 2020-02-20 DIAGNOSIS — Z98.890 OTHER SPECIFIED POSTPROCEDURAL STATES: Chronic | ICD-10-CM

## 2020-02-20 DIAGNOSIS — Z00.8 ENCOUNTER FOR OTHER GENERAL EXAMINATION: ICD-10-CM

## 2020-02-20 DIAGNOSIS — Z90.49 ACQUIRED ABSENCE OF OTHER SPECIFIED PARTS OF DIGESTIVE TRACT: Chronic | ICD-10-CM

## 2020-02-20 PROCEDURE — 74177 CT ABD & PELVIS W/CONTRAST: CPT

## 2020-02-20 PROCEDURE — 74177 CT ABD & PELVIS W/CONTRAST: CPT | Mod: 26

## 2020-04-02 DIAGNOSIS — U07.1 COVID-19: ICD-10-CM

## 2020-04-14 DIAGNOSIS — Z87.09 PERSONAL HISTORY OF OTHER DISEASES OF THE RESPIRATORY SYSTEM: ICD-10-CM

## 2020-05-19 ENCOUNTER — RX RENEWAL (OUTPATIENT)
Age: 55
End: 2020-05-19

## 2020-06-18 PROBLEM — U07.1 COVID-19: Status: ACTIVE | Noted: 2020-04-02

## 2020-08-21 ENCOUNTER — RX RENEWAL (OUTPATIENT)
Age: 55
End: 2020-08-21

## 2020-09-21 ENCOUNTER — APPOINTMENT (OUTPATIENT)
Dept: FAMILY MEDICINE | Facility: CLINIC | Age: 55
End: 2020-09-21
Payer: COMMERCIAL

## 2020-09-21 VITALS
BODY MASS INDEX: 28.28 KG/M2 | WEIGHT: 176 LBS | HEIGHT: 66 IN | SYSTOLIC BLOOD PRESSURE: 109 MMHG | OXYGEN SATURATION: 99 % | HEART RATE: 62 BPM | DIASTOLIC BLOOD PRESSURE: 76 MMHG

## 2020-09-21 DIAGNOSIS — M79.674 PAIN IN RIGHT TOE(S): ICD-10-CM

## 2020-09-21 DIAGNOSIS — R10.2 PELVIC AND PERINEAL PAIN: ICD-10-CM

## 2020-09-21 DIAGNOSIS — M79.675 PAIN IN RIGHT TOE(S): ICD-10-CM

## 2020-09-21 DIAGNOSIS — R10.13 EPIGASTRIC PAIN: ICD-10-CM

## 2020-09-21 DIAGNOSIS — F17.200 NICOTINE DEPENDENCE, UNSPECIFIED, UNCOMPLICATED: ICD-10-CM

## 2020-09-21 PROCEDURE — 99211 OFF/OP EST MAY X REQ PHY/QHP: CPT | Mod: 25

## 2020-09-21 PROCEDURE — 99396 PREV VISIT EST AGE 40-64: CPT | Mod: 25

## 2020-09-21 PROCEDURE — 93000 ELECTROCARDIOGRAM COMPLETE: CPT

## 2020-09-21 RX ORDER — LORATADINE 10 MG
TABLET,DISINTEGRATING ORAL
Refills: 0 | Status: COMPLETED | COMMUNITY
End: 2020-09-21

## 2020-09-21 RX ORDER — LORATADINE 5 MG/5 ML
SOLUTION, ORAL ORAL
Refills: 0 | Status: COMPLETED | COMMUNITY
End: 2020-09-21

## 2020-09-21 RX ORDER — PYRITHIONE ZINC 1 G/ML
SHAMPOO TOPICAL
Refills: 0 | Status: COMPLETED | COMMUNITY
End: 2020-09-21

## 2020-09-21 RX ORDER — CHOLECALCIFEROL (VITAMIN D3) 25 MCG
TABLET ORAL
Refills: 0 | Status: COMPLETED | COMMUNITY
End: 2020-09-21

## 2020-09-21 RX ORDER — DICLOFENAC SODIUM 100 MG/1
TABLET, FILM COATED, EXTENDED RELEASE ORAL
Refills: 0 | Status: COMPLETED | COMMUNITY
End: 2020-09-21

## 2020-09-21 RX ORDER — AMOXICILLIN AND CLAVULANATE POTASSIUM 875; 125 MG/1; MG/1
875-125 TABLET, COATED ORAL TWICE DAILY
Qty: 20 | Refills: 0 | Status: COMPLETED | COMMUNITY
Start: 2020-04-14 | End: 2020-09-21

## 2020-09-21 RX ORDER — MELOXICAM 7.5 MG/1
7.5 TABLET ORAL
Qty: 180 | Refills: 0 | Status: COMPLETED | COMMUNITY
Start: 2019-03-05 | End: 2020-09-21

## 2020-09-21 RX ORDER — OMEGA-3/DHA/EPA/FISH OIL 300-1000MG
CAPSULE ORAL
Refills: 0 | Status: COMPLETED | COMMUNITY
End: 2020-09-21

## 2020-09-21 RX ORDER — ALPHA-D-GALACTOSIDASE 400 UNIT
TABLET ORAL
Refills: 0 | Status: COMPLETED | COMMUNITY
End: 2020-09-21

## 2020-09-21 RX ORDER — ATORVASTATIN CALCIUM 10 MG/1
10 TABLET, FILM COATED ORAL
Refills: 0 | Status: COMPLETED | COMMUNITY
End: 2020-09-21

## 2020-09-21 RX ORDER — GABAPENTIN ENACARBIL 300 MG/1
300 TABLET, EXTENDED RELEASE ORAL
Refills: 0 | Status: COMPLETED | COMMUNITY
End: 2020-09-21

## 2020-09-21 RX ORDER — LACTASE 3000 UNIT
TABLET ORAL
Refills: 0 | Status: COMPLETED | COMMUNITY
End: 2020-09-21

## 2020-09-21 RX ORDER — LEVOTHYROXINE SODIUM 50 UG/1
50 TABLET ORAL
Qty: 90 | Refills: 0 | Status: COMPLETED | COMMUNITY
Start: 2019-04-15 | End: 2020-09-21

## 2020-09-21 RX ORDER — FAMOTIDINE 20 MG/1
20 TABLET, FILM COATED ORAL
Qty: 180 | Refills: 1 | Status: COMPLETED | COMMUNITY
Start: 2018-07-02 | End: 2020-09-21

## 2020-09-22 NOTE — PHYSICAL EXAM
[Normal] : no acute distress, well nourished, well developed and well-appearing [Normal Sclera/Conjunctiva] : normal sclera/conjunctiva [PERRL] : pupils equal round and reactive to light [Normal Outer Ear/Nose] : the outer ears and nose were normal in appearance [Normal Oropharynx] : the oropharynx was normal [Normal TMs] : both tympanic membranes were normal [No JVD] : no jugular venous distention [No Lymphadenopathy] : no lymphadenopathy [Supple] : supple [No Respiratory Distress] : no respiratory distress  [No Accessory Muscle Use] : no accessory muscle use [Clear to Auscultation] : lungs were clear to auscultation bilaterally [Normal Rate] : normal rate  [Regular Rhythm] : with a regular rhythm [Normal S1, S2] : normal S1 and S2 [Pedal Pulses Present] : the pedal pulses are present [No Edema] : there was no peripheral edema [Soft] : abdomen soft [Non-distended] : non-distended [Normal Supraclavicular Nodes] : no supraclavicular lymphadenopathy [Normal Posterior Cervical Nodes] : no posterior cervical lymphadenopathy [Normal Anterior Cervical Nodes] : no anterior cervical lymphadenopathy [Grossly Normal Strength/Tone] : grossly normal strength/tone [No Rash] : no rash [Coordination Grossly Intact] : coordination grossly intact [Normal Gait] : normal gait [Normal Affect] : the affect was normal [Normal Insight/Judgement] : insight and judgment were intact [de-identified] : epigastric and pelvic tenderness [de-identified] : mild paraspinal tenderness- thoracic and lumbar regions

## 2020-09-22 NOTE — REVIEW OF SYSTEMS
[Fatigue] : fatigue [Constipation] : constipation [Joint Pain] : joint pain [Negative] : Heme/Lymph [FreeTextEntry7] : pain- right upper quadrant and back

## 2020-09-22 NOTE — PLAN
[FreeTextEntry1] : HCM:\par Obtain labs\par Gyn eval\par Mammogram\par Flu shot\par \par Abdominal pain:\par Follow up with GI\par \par Pelvic pain:\par US pelvis\par Gyn\par \par Back pain:\par Xray thoracic spine\par \par Pain in feet:\par Podiatry evaluation

## 2020-09-22 NOTE — HISTORY OF PRESENT ILLNESS
[de-identified] : Patient presents for physical \par Doing well, but continues to experience upper abdominal and back pain.\par Cholecystectomy was recommended by GI but patient will be seeing another GI for second opinion.\par She has stopped all medications except her thyroid medication because her GI doctor wanted to see if the pain in her belly was secondary to her medications. She has not noted improvement since stopping the medications.\par She will be scheduling an appointment with gyn for pap\par Her last colonoscopy was 5 years ago\par Currently pursuing a divorce

## 2020-09-22 NOTE — HEALTH RISK ASSESSMENT
[No] : No [0] : 2) Feeling down, depressed, or hopeless: Not at all (0) [With Family] : lives with family [] :  [Feels Safe at Home] : Feels safe at home [Reports changes in dental health] : Reports changes in dental health [Smoke Detector] : smoke detector [Carbon Monoxide Detector] : carbon monoxide detector [Seat Belt] :  uses seat belt [] : No [de-identified] : active at work- no regular exercise  [de-identified] : trying to eat a healthy diet  [[id number]] : 0 [Sexually Active] : not sexually active [Reports changes in hearing] : Reports no changes in hearing [MammogramDate] : 2019 [ColonoscopyDate] : 5 years ago [de-identified] : saw eye doctor- everything is good [de-identified] : had dental implants

## 2020-10-14 LAB
ALBUMIN SERPL ELPH-MCNC: 4.5 G/DL
ALP BLD-CCNC: 84 U/L
ALT SERPL-CCNC: 28 U/L
ANION GAP SERPL CALC-SCNC: 11 MMOL/L
AST SERPL-CCNC: 25 U/L
BASOPHILS # BLD AUTO: 0.02 K/UL
BASOPHILS NFR BLD AUTO: 0.3 %
BILIRUB SERPL-MCNC: 0.2 MG/DL
BUN SERPL-MCNC: 10 MG/DL
CALCIUM SERPL-MCNC: 9.5 MG/DL
CHLORIDE SERPL-SCNC: 104 MMOL/L
CHOLEST SERPL-MCNC: 236 MG/DL
CHOLEST/HDLC SERPL: 5 RATIO
CO2 SERPL-SCNC: 27 MMOL/L
CREAT SERPL-MCNC: 0.59 MG/DL
EOSINOPHIL # BLD AUTO: 0.14 K/UL
EOSINOPHIL NFR BLD AUTO: 2 %
GLUCOSE SERPL-MCNC: 92 MG/DL
HCT VFR BLD CALC: 40.3 %
HDLC SERPL-MCNC: 47 MG/DL
HGB BLD-MCNC: 12.8 G/DL
IMM GRANULOCYTES NFR BLD AUTO: 0.1 %
LDLC SERPL CALC-MCNC: 146 MG/DL
LYMPHOCYTES # BLD AUTO: 2.64 K/UL
LYMPHOCYTES NFR BLD AUTO: 37.9 %
MAN DIFF?: NORMAL
MCHC RBC-ENTMCNC: 29 PG
MCHC RBC-ENTMCNC: 31.8 GM/DL
MCV RBC AUTO: 91.4 FL
MONOCYTES # BLD AUTO: 0.38 K/UL
MONOCYTES NFR BLD AUTO: 5.5 %
NEUTROPHILS # BLD AUTO: 3.77 K/UL
NEUTROPHILS NFR BLD AUTO: 54.2 %
PLATELET # BLD AUTO: 259 K/UL
POTASSIUM SERPL-SCNC: 4 MMOL/L
PROT SERPL-MCNC: 7 G/DL
RBC # BLD: 4.41 M/UL
RBC # FLD: 13.6 %
SODIUM SERPL-SCNC: 142 MMOL/L
T4 FREE SERPL-MCNC: 1.3 NG/DL
TRIGL SERPL-MCNC: 214 MG/DL
TSH SERPL-ACNC: 0.24 UIU/ML
WBC # FLD AUTO: 6.96 K/UL

## 2020-10-16 ENCOUNTER — APPOINTMENT (OUTPATIENT)
Dept: ULTRASOUND IMAGING | Facility: CLINIC | Age: 55
End: 2020-10-16
Payer: COMMERCIAL

## 2020-10-16 ENCOUNTER — RESULT REVIEW (OUTPATIENT)
Age: 55
End: 2020-10-16

## 2020-10-16 ENCOUNTER — OUTPATIENT (OUTPATIENT)
Dept: OUTPATIENT SERVICES | Facility: HOSPITAL | Age: 55
LOS: 1 days | End: 2020-10-16
Payer: COMMERCIAL

## 2020-10-16 ENCOUNTER — APPOINTMENT (OUTPATIENT)
Dept: RADIOLOGY | Facility: CLINIC | Age: 55
End: 2020-10-16
Payer: COMMERCIAL

## 2020-10-16 DIAGNOSIS — Z98.890 OTHER SPECIFIED POSTPROCEDURAL STATES: Chronic | ICD-10-CM

## 2020-10-16 DIAGNOSIS — Z00.00 ENCOUNTER FOR GENERAL ADULT MEDICAL EXAMINATION WITHOUT ABNORMAL FINDINGS: ICD-10-CM

## 2020-10-16 DIAGNOSIS — Z90.49 ACQUIRED ABSENCE OF OTHER SPECIFIED PARTS OF DIGESTIVE TRACT: Chronic | ICD-10-CM

## 2020-10-16 PROCEDURE — 76856 US EXAM PELVIC COMPLETE: CPT | Mod: 26

## 2020-10-16 PROCEDURE — 72070 X-RAY EXAM THORAC SPINE 2VWS: CPT | Mod: 26

## 2020-10-16 PROCEDURE — 76830 TRANSVAGINAL US NON-OB: CPT | Mod: 26

## 2020-10-16 PROCEDURE — 72070 X-RAY EXAM THORAC SPINE 2VWS: CPT

## 2020-10-16 PROCEDURE — 76856 US EXAM PELVIC COMPLETE: CPT

## 2020-10-16 PROCEDURE — 76830 TRANSVAGINAL US NON-OB: CPT

## 2020-10-23 DIAGNOSIS — R93.89 ABNORMAL FINDINGS ON DIAGNOSTIC IMAGING OF OTHER SPECIFIED BODY STRUCTURES: ICD-10-CM

## 2020-11-03 ENCOUNTER — APPOINTMENT (OUTPATIENT)
Dept: MRI IMAGING | Facility: CLINIC | Age: 55
End: 2020-11-03

## 2020-11-03 ENCOUNTER — OUTPATIENT (OUTPATIENT)
Dept: OUTPATIENT SERVICES | Facility: HOSPITAL | Age: 55
LOS: 1 days | End: 2020-11-03
Payer: COMMERCIAL

## 2020-11-03 DIAGNOSIS — Z98.890 OTHER SPECIFIED POSTPROCEDURAL STATES: Chronic | ICD-10-CM

## 2020-11-03 DIAGNOSIS — Z00.8 ENCOUNTER FOR OTHER GENERAL EXAMINATION: ICD-10-CM

## 2020-11-03 DIAGNOSIS — Z90.49 ACQUIRED ABSENCE OF OTHER SPECIFIED PARTS OF DIGESTIVE TRACT: Chronic | ICD-10-CM

## 2020-11-03 PROCEDURE — 72146 MRI CHEST SPINE W/O DYE: CPT | Mod: 26

## 2020-11-03 PROCEDURE — 72146 MRI CHEST SPINE W/O DYE: CPT

## 2020-11-04 ENCOUNTER — NON-APPOINTMENT (OUTPATIENT)
Age: 55
End: 2020-11-04

## 2020-11-04 DIAGNOSIS — M47.814 SPONDYLOSIS W/OUT MYELOPATHY OR RADICULOPATHY, THORACIC REGION: ICD-10-CM

## 2020-11-04 DIAGNOSIS — M51.9 UNSPECIFIED THORACIC, THORACOLUMBAR AND LUMBOSACRAL INTERVERTEBRAL DISC DISORDER: ICD-10-CM

## 2020-11-13 ENCOUNTER — RESULT REVIEW (OUTPATIENT)
Age: 55
End: 2020-11-13

## 2020-11-13 ENCOUNTER — APPOINTMENT (OUTPATIENT)
Dept: ULTRASOUND IMAGING | Facility: CLINIC | Age: 55
End: 2020-11-13
Payer: COMMERCIAL

## 2020-11-13 ENCOUNTER — APPOINTMENT (OUTPATIENT)
Dept: RADIOLOGY | Facility: CLINIC | Age: 55
End: 2020-11-13

## 2020-11-13 ENCOUNTER — APPOINTMENT (OUTPATIENT)
Dept: MAMMOGRAPHY | Facility: CLINIC | Age: 55
End: 2020-11-13
Payer: COMMERCIAL

## 2020-11-13 ENCOUNTER — OUTPATIENT (OUTPATIENT)
Dept: OUTPATIENT SERVICES | Facility: HOSPITAL | Age: 55
LOS: 1 days | End: 2020-11-13
Payer: COMMERCIAL

## 2020-11-13 DIAGNOSIS — Z90.49 ACQUIRED ABSENCE OF OTHER SPECIFIED PARTS OF DIGESTIVE TRACT: Chronic | ICD-10-CM

## 2020-11-13 DIAGNOSIS — Z98.890 OTHER SPECIFIED POSTPROCEDURAL STATES: Chronic | ICD-10-CM

## 2020-11-13 DIAGNOSIS — Z00.8 ENCOUNTER FOR OTHER GENERAL EXAMINATION: ICD-10-CM

## 2020-11-13 PROCEDURE — 76641 ULTRASOUND BREAST COMPLETE: CPT | Mod: 26,50

## 2020-11-13 PROCEDURE — 77067 SCR MAMMO BI INCL CAD: CPT | Mod: 26

## 2020-11-13 PROCEDURE — 77063 BREAST TOMOSYNTHESIS BI: CPT | Mod: 26

## 2020-11-13 PROCEDURE — 77063 BREAST TOMOSYNTHESIS BI: CPT

## 2020-11-13 PROCEDURE — 76641 ULTRASOUND BREAST COMPLETE: CPT

## 2020-11-13 PROCEDURE — 77067 SCR MAMMO BI INCL CAD: CPT

## 2020-11-30 ENCOUNTER — RX RENEWAL (OUTPATIENT)
Age: 55
End: 2020-11-30

## 2020-12-23 PROBLEM — Z87.09 HISTORY OF ACUTE SINUSITIS: Status: RESOLVED | Noted: 2020-04-14 | Resolved: 2020-12-23

## 2021-01-19 ENCOUNTER — RX RENEWAL (OUTPATIENT)
Age: 56
End: 2021-01-19

## 2021-01-22 NOTE — H&P PST ADULT - COMFORT LEVEL, ACCEPTABLE
[facility]          [facility]        [address]        [address] [id number]           Inpatient Initial consult note         1/22/2021 2:08 PM    Patient Name:   Myesha Fraga:    1952  Primary Care Physician:  Rashad Severino     History Obtained From:  electronic medical record     Consultation requested by : Roger Lacey MD    requested for  : Evaluation of  worsening renal function, Acidosis and  Hypotension and lactic acidosis     History of presentingillnirav Looney is a 76 y.o.   female with Past Medical History as stated below presented with a chief complaint of No chief complaint on file. on 1/22/2021 . Patient apparently was found unresponsive at the skilled nursing facility with a respiratory rate of 6 and she was intubated as well at the [facility]. Patient was recently in [facility] and was discharged after an admission for sepsis however source could not be identified patient's previous blood cultures at some point was ESBL positive E. coli in the urine. [facility] patient had a femoral central line placed and was transferred to [facility] for management of sepsis and respiratory failure she was having abnormal appearing urine was started on antibiotics. Patient has been on and off confused since the last few days and was having on and off issues with blood pressure as well. She has not been eating or drinking well    Seen her this morning and she was having hypotension, septic, lactic acidosis and metabolic acidosis so started her on CVVH.      Past History      Past Medical History:   Diagnosis Date    Adjustment disorder with mixed anxiety and depressed mood     Anemia in chronic kidney disease(285.21)     Anxiety     Arthritis     Asthma     Bipolar 1 disorder (HCC)     Bradycardia     [facility]     Chronic diastolic heart failure (HCC)     Chronic kidney disease, stage III (moderate)     Depression     Difficulty walking     Dizziness and giddiness     GERD (gastroesophageal reflux disease)     Gout     Hyperkalemia     Hyperlipidemia     Hypertension     Hypertensive urgency     Hypothyroidism     Low back pain     Major depressive disorder, recurrent, mild (HCC)     Muscle weakness (generalized)     Neurogenic bladder     Pleural effusion 06/16/2019    R pleurex catheter placed    Pneumonia     Seizure ([facility] [address] 01/2018    Type II or unspecified type diabetes mellitus without mention of complication, not stated as uncontrolled     Urine retention      Past Surgical History:   Procedure Laterality Date    BACK SURGERY      CERVICAL FUSION      CHOLECYSTECTOMY      COLONOSCOPY      ENDOSCOPY, COLON, DIAGNOSTIC      FRACTURE SURGERY       Social History     Socioeconomic History    Marital status:      Spouse name: Not on file    Number of children: 1    Years of education: Not on file    Highest education level: Not on file   Occupational History    Not on file   Social Needs    Financial resource strain: Not on file    Food insecurity     Worry: Not on file     Inability: Not on file   [employer] needs     Medical: Not on file     Non-medical: Not on file   Tobacco Use    Smoking status: Never Smoker    Smokeless tobacco: Never Used   Substance and Sexual Activity    Alcohol use: No    Drug use: No    Sexual activity: Not Currently   Lifestyle    Physical activity     Days per week: Not on file     Minutes per session: Not on file    Stress: Not on file   Relationships    Social connections     Talks on phone: Not on file     Gets together: Not on file     Attends Yarsanism service: Not on file     Active member of club or organization: Not on file     Attends meetings of clubs or organizations: Not on file     Relationship status: Not on file    Intimate partner violence Fear of current or ex partner: Not on file     Emotionally abused: Not on file     Physically abused: Not on file     Forced sexual activity: Not on file   Other Topics Concern    Not on file   Social History Narrative    Not on file     Family History   Problem Relation Age of Onset    High Blood Pressure Mother     Diabetes Mother     Cancer Mother     Heart Attack Mother     Stroke Father     High Blood Pressure Father     Anemia Father      Medications & Allergies      Prior to Admission medications    Medication Sig Start Date End Date Taking? Authorizing Provider   midodrine (PROAMATINE) 10 MG tablet Take 1 tablet by mouth 3 times daily (with meals) Hold for SBP >120 1/8/21   Damari Melgoza MD   potassium bicarb-citric acid (EFFER-K) 20 MEQ TBEF effervescent tablet Take 2 tablets by mouth daily 1/9/21   Damari Melgoza MD   sodium chloride 1 g tablet Take 2 tablets by mouth 2 times daily 1/8/21   Damari Melgoza MD   acetaminophen-codeine (TYLENOL #3) 300-30 MG per tablet Take 1 tablet by mouth every 6 hours as needed (arthritis pain) for up to 30 days. 1/8/21 2/7/21  Damari Melgoza MD   pregabalin (LYRICA) 50 MG capsule Take 1 capsule by mouth daily for 5 days. 1/8/21 1/13/21  Damari Melgoza MD   Magnesium Chloride-Calcium  MG Take 1 tablet by mouth 2 times daily    Historical Provider, MD   megestrol acetate (MEGACE) 400 MG/10ML SUSP Take 400 mg by mouth daily    Historical Provider, MD   omeprazole (PRILOSEC) 20 MG delayed release capsule Take 20 mg by mouth daily    Historical Provider, MD   sertraline (ZOLOFT) 50 MG tablet Take 50 mg by mouth daily    Historical Provider, MD   lidocaine (XYLOCAINE) 5 % ointment Apply topically as needed.  8/28/20   Sebastian Escamilla MD   busPIRone (BUSPAR) 5 MG tablet Take 5 mg by mouth 2 times daily    Historical Provider, MD   insulin glargine [facility]) 100 UNIT/ML injection pen Inject 4 Units into the skin every morning 1/29/20   Saad GARCÍA Sarah Mares MD   oxybutynin (DITROPAN-XL) 5 MG extended release tablet Take 5 mg by mouth daily    Historical Provider, MD   benzonatate (TESSALON) 100 MG capsule Take 100 mg by mouth every 8 hours as needed for Cough    Historical Provider, MD   polyethylene glycol (GLYCOLAX) powder Take 17 g by mouth daily    Historical Provider, MD   B Complex-JAYDA-Folic Acid (RENAL) 1 MG CAPS Take 1 capsule by mouth daily    Historical Provider, MD   insulin aspart (NOVOLOG) 100 UNIT/ML injection vial Inject 0-12 Units into the skin 3 times daily (before meals) Per sliding scale; 140-199= 1 unit, 200-249= 2 units, 250-299= 3 units,  300-349= 4 units, 350-399= 5 units, 400-450= 6 units, 451-500= 10 units, >500 give 12 units & recheck in one hour. Historical Provider, MD   fluticasone (FLONASE) 50 MCG/ACT nasal spray 2 sprays by Each Nostril route daily     Historical Provider, MD   polycarbophil (FIBERCON) 625 MG tablet Take 3 tablets by mouth 3 times daily (with meals)     Historical Provider, MD   sevelamer (RENVELA) 800 MG tablet Take 2 tablets by mouth 3 times daily (with meals)    Historical Provider, MD   guaiFENesin 200 MG tablet Take 200 mg by mouth every 4 hours as needed (cough)     Historical Provider, MD   ondansetron (ZOFRAN-ODT) 4 MG disintegrating tablet Take 4 mg by mouth every 8 hours as needed for Nausea or Vomiting     Historical Provider, MD   meclizine (ANTIVERT) 25 MG tablet Take 25 mg by mouth 3 times daily    Historical Provider, MD   gabapentin (NEURONTIN) 300 MG capsule Take 300 mg by mouth daily. 7/31/18   Historical Provider, MD   acetaminophen (TYLENOL) 325 MG tablet Take 650 mg by mouth every 6 hours as needed for Pain    Historical Provider, MD   levothyroxine (SYNTHROID) 25 MCG tablet Take 25 mcg by mouth Daily    Historical Provider, MD   blood glucose test strips (ASCENSIA AUTODISC VI;ONE TOUCH ULTRA TEST VI) strip Patient tests blood sugar three times per day.  Diagnosis DMII E11.9 7/18/18 Historical Provider, MD   pravastatin (PRAVACHOL) 40 MG tablet Take 40 mg by mouth nightly     Historical Provider, MD     Allergies: Eggs or egg-derived products, Aspirin, Naproxen, Pcn [penicillins], Vicodin [hydrocodone-acetaminophen], Vilazodone, and Wellbutrin [bupropion]  IP meds : Scheduled Meds:   sodium chloride flush  10 mL Intravenous 2 times per day    levothyroxine  25 mcg Per NG tube Daily    pravastatin  40 mg Oral Nightly    meropenem  1,000 mg Intravenous Q8H    albumin human  25 g Intravenous Q8H    sodium chloride  2,000 mL Intravenous Once     Continuous Infusions:   norepinephrine 45 mcg/min (01/22/21 1126)    propofol 20 mcg/kg/min (01/22/21 0810)    vasopressin (Septic Shock) infusion      phenylephrine (XIOMARA-SYNEPHRINE) 50mg/250mL infusion      sodium chloride 100 mL/hr at 01/22/21 1030    prismaSATE BGK 4/2.5 800 mL/hr (01/22/21 1012)    prismaSol BGK 4/2.5 800 mL/hr (01/22/21 1011)    prismaSol BGK 4/2.5 800 mL/hr (01/22/21 1014)    sodium bicarbonate infusion 999 mL/hr at 01/22/21 1002     Review of Systems Physical Exam   Review of Systems   Unable to perform ROS: Intubated    Physical Exam  Vitals signs reviewed. Constitutional:       General: She is not in acute distress. Appearance: Normal appearance. She is ill-appearing and toxic-appearing. She is not diaphoretic. HENT:      Head: Normocephalic and atraumatic. Comments: Et tube in place . Right Ear: External ear normal.      Left Ear: External ear normal.      Nose: Nose normal.      Mouth/Throat:      Mouth: Mucous membranes are dry. Comments: ET tube in place  Eyes:      General: No scleral icterus. Right eye: No discharge. Left eye: No discharge. Conjunctiva/sclera: Conjunctivae normal.   Neck:      Musculoskeletal: Normal range of motion and neck supple. Thyroid: No thyromegaly. Vascular: No JVD. Cardiovascular:      Rate and Rhythm: Regular rhythm.  Tachycardia present. Heart sounds: Normal heart sounds. No murmur. Pulmonary:      Effort: Pulmonary effort is normal. No respiratory distress. Breath sounds: Normal breath sounds. No stridor. No rales. Chest:      Chest wall: No tenderness. Abdominal:      General: Bowel sounds are normal. There is no distension. Palpations: Abdomen is soft. Tenderness: There is no abdominal tenderness. Musculoskeletal:         General: No tenderness. Right lower leg: No edema. Left lower leg: No edema. Comments: Mottled appearance noted of the extremities   Skin:     General: Skin is warm and dry. Findings: No erythema or rash. Neurological:      Comments: Intubated not responding to verbal commands   Psychiatric:      Comments: Not agitated           Vitals:    01/22/21 1400   BP:    Pulse: 90   Resp: 20   Temp: 96.6 °F (35.9 °C)   SpO2: 97%     Labs, Radiology and Tests       Recent Labs     01/22/21  0950   WBC 35.3*   RBC 2.76*   HGB 9.1*   HCT 31.4*   .8*   MCH 33.0   MCHC 29.0*   *     Recent Labs     01/22/21  0520 01/22/21  0950    134*   K 4.7 4.5   CL 98 97*   CO2 12* 9*   BUN 21 18   CREATININE 4.4* 3.5*   CALCIUM 8.7 7.5*   PROT 4.4* 3.2*   LABALBU 1.7* 1.1*   BILITOT 0.6 0.7   ALKPHOS 183* 160*   * 634*   * 160*       Radiology : Chest x-ray reviewed by me shows no significant congestion or cardiomegaly. Some pleural effusion on the right side cannot rule out any consolidation    Other : Echocardiogram 8/20 shows ejection fraction 60% and myxomatous degeneration of mitral valve    Assessment    1 Renal -ESRD on peritoneal dialysis, now with significant metabolic acidosis and lactic acidosis and hypotension  ? So started the patient on CVVH, tolerating okay. Currently net positive UF due to fluid resuscitation  ? On 90% FiO2 but unable to read the saturations well. ? Continue every 4 hours labs.   Continue bicarb drip for now    2 Electrolytes -mild hyponeutropenia and hypocalcemia with corrected calcium reasonable replace all electrolytes per protocol  3 Hypotension doing better on 1 pressors wean to a map greater than 65  4 Metabolic acidosis with significant anion gap and lactic acidosis, continue bicarb drip for now CVVH should correct once bicarb is reasonable will discontinue the bicarb drip we will also check an ABG  5 Ventilator dependent respiratory failure  6 Septic shock most likely secondary to urinary tract infection on antibiotics. 7 Hx of diabetes mellitus  8 Elevated liver function tests mostly due to shock liver  9 Meds reviewed and discussed with ICU team, patient's son and nursing staff in extensive detail      **This report has been created using voice recognition software. It maycontain minor  errors which are inherent in voice recognition technology. **    Rolando Rivers M.D  [facility]. 2

## 2021-01-28 NOTE — H&P PST ADULT - FALL HARM RISK CONCLUSION
Detail Level: Detailed Hide Additional Notes?: No Additional Notes (Optional): Benign, no treatment required. Universal Safety Interventions

## 2021-02-03 ENCOUNTER — APPOINTMENT (OUTPATIENT)
Dept: ORTHOPEDIC SURGERY | Facility: CLINIC | Age: 56
End: 2021-02-03
Payer: COMMERCIAL

## 2021-02-03 VITALS
SYSTOLIC BLOOD PRESSURE: 111 MMHG | BODY MASS INDEX: 26.52 KG/M2 | TEMPERATURE: 97.3 F | HEIGHT: 66 IN | DIASTOLIC BLOOD PRESSURE: 73 MMHG | WEIGHT: 165 LBS

## 2021-02-03 DIAGNOSIS — M51.34 OTHER INTERVERTEBRAL DISC DEGENERATION, THORACIC REGION: ICD-10-CM

## 2021-02-03 DIAGNOSIS — M54.6 PAIN IN THORACIC SPINE: ICD-10-CM

## 2021-02-03 DIAGNOSIS — M51.24 OTHER INTERVERTEBRAL DISC DISPLACEMENT, THORACIC REGION: ICD-10-CM

## 2021-02-03 DIAGNOSIS — G89.29 PAIN IN THORACIC SPINE: ICD-10-CM

## 2021-02-03 PROCEDURE — 99214 OFFICE O/P EST MOD 30 MIN: CPT

## 2021-02-03 PROCEDURE — 99072 ADDL SUPL MATRL&STAF TM PHE: CPT

## 2021-02-22 ENCOUNTER — RX RENEWAL (OUTPATIENT)
Age: 56
End: 2021-02-22

## 2021-02-26 NOTE — ASU PATIENT PROFILE, ADULT - NS SC CAGE ALCOHOL GUILTY ABOUT
Pt screamed at RN about not getting her pain meds or blankets. Pt then asked to be unhooked so she can go to the bathroom. Pt has screamed multiple times at RN and other staff for \"not acting quickly\". Pt stated that RN is prejudice about who they help and does not like her. RN stated that they would not tolerate being cursed at but does want to help her. no

## 2021-03-31 NOTE — ASU PATIENT PROFILE, ADULT - TEACHING/LEARNING LEARNING PREFERENCES
Care Management Interventions  PCP Verified by CM: Yes(Alfred Renae)  Mode of Transport at Discharge: Other (see comment)(EMS)  Transition of Care Consult (CM Consult): Home Health, Discharge [address] [address]: No  Reason Outside [place]: Patient already serviced by other home care/hospice agency  [place] Signup: No  Discharge Durable Medical Equipment: No  Physical Therapy Consult: Yes  Occupational Therapy Consult: Yes  Speech Therapy Consult: No  Current Support Network: Own Home, Lives Alone  Confirm Follow Up Transport: (EMS)  The Patient and/or Patient Representative was Provided with a Choice of Provider and Agrees with the Discharge Plan?: Yes  Freedom of Choice List was Provided with Basic Dialogue that Supports the Patient's Individualized Plan of Care/Goals, Treatment Preferences and Shares the Quality Data Associated with the Providers?: Yes  Discharge Location  Discharge Placement: Home with home health    1100:  SARY follow-up with patient. During our conversation, Maria R cline/Carmita came into room and confirmed that patient will be receiving HH (PT/OT/RN/Aide) upon discharge. PCP will be Dr. Cooper Lackey ([phone]). Patient states that she's \"ready to get out of here. \"  Per patient, she currently has 3 remotes to her garage door that are with friends. Unfortunately, she hasn't been able to get in touch with any of these friends to meet her at home so she can get in. She will continue working on this today. Additionally, patient now states that she will require transportation home - SW will assist with this. Bundle letter presented to patient. Document signed and placed on chart. Copy of letter provided to patient. SW will follow-up with patient shortly to readdress discharge plan/entry into her home. 1315:  SARY follow-up with patient. Per patient, her ex- Dang Saldana) will bring her garage door remote to her home within the next 15-20 minutes.   He will place it in her mailbox for her to be able to get into her home. Patient states that she's reached out to [facility] with to arrange private duty nursing (name of agency unknown). Phone call to [employer]. Pick-up scheduled for 1600. Patient/RN aware. 1500:  SW follow-up with patient. Patient still wanting to discharge today. SW'r assisted patient with calling [facility]. Appointment scheduled for intake with [facility] tomorrow at [address] [facility] w/[facility] [address]anco notified of discharge. Voicemail left for Kei Drake with [facility] notifying of patient's discharge home.     ARNULFO Yeager  [place]   [phone] verbal instruction

## 2021-04-18 ENCOUNTER — RX RENEWAL (OUTPATIENT)
Age: 56
End: 2021-04-18

## 2021-05-16 ENCOUNTER — RX RENEWAL (OUTPATIENT)
Age: 56
End: 2021-05-16

## 2021-08-08 ENCOUNTER — RX RENEWAL (OUTPATIENT)
Age: 56
End: 2021-08-08

## 2021-08-12 NOTE — ASU PATIENT PROFILE, ADULT - PMH
oriented to person, place and time , normal mood with an appropriate affect Carpal tunnel syndrome    H. pylori infection    Hemorrhoids, internal    Hyperlipidemia    Hypothyroid    Rheumatoid arthritis    Trigger finger of right thumb

## 2021-09-30 ENCOUNTER — APPOINTMENT (OUTPATIENT)
Dept: FAMILY MEDICINE | Facility: CLINIC | Age: 56
End: 2021-09-30
Payer: COMMERCIAL

## 2021-09-30 VITALS
DIASTOLIC BLOOD PRESSURE: 69 MMHG | HEIGHT: 66 IN | RESPIRATION RATE: 14 BRPM | OXYGEN SATURATION: 99 % | SYSTOLIC BLOOD PRESSURE: 106 MMHG | WEIGHT: 171 LBS | BODY MASS INDEX: 27.48 KG/M2 | TEMPERATURE: 98.1 F | HEART RATE: 71 BPM

## 2021-09-30 DIAGNOSIS — Z87.898 PERSONAL HISTORY OF OTHER SPECIFIED CONDITIONS: ICD-10-CM

## 2021-09-30 DIAGNOSIS — R20.2 PARESTHESIA OF SKIN: ICD-10-CM

## 2021-09-30 PROCEDURE — 93000 ELECTROCARDIOGRAM COMPLETE: CPT

## 2021-09-30 PROCEDURE — 99396 PREV VISIT EST AGE 40-64: CPT | Mod: 25

## 2021-09-30 RX ORDER — THIAMINE HCL 100 MG
500 TABLET ORAL
Refills: 0 | Status: ACTIVE | COMMUNITY
Start: 2021-09-30

## 2021-09-30 RX ORDER — MULTIVIT-MIN/FA/LYCOPEN/LUTEIN .4-300-25
TABLET ORAL
Refills: 0 | Status: ACTIVE | COMMUNITY
Start: 2021-09-30

## 2021-09-30 RX ORDER — POLYETHYLENE GLYCOL 400 AND PROPYLENE GLYCOL 4; 3 MG/ML; MG/ML
0.4-0.3 SOLUTION/ DROPS OPHTHALMIC
Refills: 0 | Status: ACTIVE | COMMUNITY
Start: 2021-09-30

## 2021-09-30 RX ORDER — FAMOTIDINE 10 MG/1
10 TABLET, FILM COATED ORAL
Refills: 0 | Status: ACTIVE | COMMUNITY
Start: 2021-09-30

## 2021-10-02 NOTE — HEALTH RISK ASSESSMENT
[0] : 2) Feeling down, depressed, or hopeless: Not at all (0) [PHQ-2 Negative - No further assessment needed] : PHQ-2 Negative - No further assessment needed [Patient reported colonoscopy was normal] : Patient reported colonoscopy was normal [] :  [] : No [de-identified] : no regular exercise but very active [[id number]] : 0 [de-identified] : tries to eat a healthy diet [ColonoscopyDate] : 03/20

## 2021-10-02 NOTE — PLAN
[FreeTextEntry1] : \par HCM:\par Obtain labs\par Gyn eval\par Up to date with colonoscopy\par EKG and BP wnl\par Flu shot and COVID booster prioritized- will then discuss Shingles vaccine\par Ensure healthy diet, adequate sleep and hydration\par \par Carpal tunnel:\par Follow up with Dr. Diop\par \par Localized paresthesia:\par Neuro/spine for nerve conduction studies

## 2021-10-02 NOTE — REVIEW OF SYSTEMS
[Negative] : Heme/Lymph [FreeTextEntry7] : see hpi [FreeTextEntry9] : see hpi [de-identified] : see hpi

## 2021-10-02 NOTE — PHYSICAL EXAM
[Normal] : no acute distress, well nourished, well developed and well-appearing [Normal Sclera/Conjunctiva] : normal sclera/conjunctiva [PERRL] : pupils equal round and reactive to light [Normal Outer Ear/Nose] : the outer ears and nose were normal in appearance [Normal TMs] : both tympanic membranes were normal [Normal Oropharynx] : the oropharynx was normal [No JVD] : no jugular venous distention [No Lymphadenopathy] : no lymphadenopathy [Supple] : supple [No Respiratory Distress] : no respiratory distress  [No Accessory Muscle Use] : no accessory muscle use [Normal Rate] : normal rate  [Clear to Auscultation] : lungs were clear to auscultation bilaterally [Regular Rhythm] : with a regular rhythm [Normal S1, S2] : normal S1 and S2 [Pedal Pulses Present] : the pedal pulses are present [No Edema] : there was no peripheral edema [Soft] : abdomen soft [Non-distended] : non-distended [Normal Supraclavicular Nodes] : no supraclavicular lymphadenopathy [Normal Posterior Cervical Nodes] : no posterior cervical lymphadenopathy [Normal Anterior Cervical Nodes] : no anterior cervical lymphadenopathy [No Spinal Tenderness] : no spinal tenderness [Grossly Normal Strength/Tone] : grossly normal strength/tone [Coordination Grossly Intact] : coordination grossly intact [No Rash] : no rash [Normal Gait] : normal gait [Normal Affect] : the affect was normal [Normal Insight/Judgement] : insight and judgment were intact [de-identified] : mild diffuse tenderness (improved compared to prior exam)

## 2021-10-02 NOTE — HISTORY OF PRESENT ILLNESS
[de-identified] : Patient presents for physical exam\par Working as a nurse's aide at [employer]\par She had been experiencing joint pains but the pain is relieved with meloxicam (prescribed by Dr. Khan)\par Not taking the medication every day to avoid GI or renal complications\par Saw GI within the year- Dr. Jacobo\par GI and Pain both feel that her pain in her back and abdomen are secondary to her spine\par She is experiencing new localized numbness to the left hip area\par Also notes resumption of carpal tunnel symptoms in her right hand- returning to see Dr. duran \par Patient experiences chronic constipation\par Colonoscopy done last year- normal\par Did not see gyn or undergo mammogram last year\par Had ingrown nail treated by podiatry\par Had COVID vaccine- Pfizer\par Will be obtaining flu shot at work\par

## 2021-10-26 LAB
25(OH)D3 SERPL-MCNC: 40.6 NG/ML
ALBUMIN SERPL ELPH-MCNC: 4.6 G/DL
ALP BLD-CCNC: 79 U/L
ALT SERPL-CCNC: 34 U/L
ANION GAP SERPL CALC-SCNC: 11 MMOL/L
AST SERPL-CCNC: 35 U/L
BASOPHILS # BLD AUTO: 0.03 K/UL
BASOPHILS NFR BLD AUTO: 0.5 %
BILIRUB SERPL-MCNC: 0.3 MG/DL
BUN SERPL-MCNC: 16 MG/DL
CALCIUM SERPL-MCNC: 9.7 MG/DL
CHLORIDE SERPL-SCNC: 107 MMOL/L
CHOLEST SERPL-MCNC: 262 MG/DL
CO2 SERPL-SCNC: 25 MMOL/L
CREAT SERPL-MCNC: 0.72 MG/DL
EOSINOPHIL # BLD AUTO: 0.2 K/UL
EOSINOPHIL NFR BLD AUTO: 3.3 %
ESTIMATED AVERAGE GLUCOSE: 120 MG/DL
GLUCOSE SERPL-MCNC: 97 MG/DL
HBA1C MFR BLD HPLC: 5.8 %
HCT VFR BLD CALC: 38.3 %
HDLC SERPL-MCNC: 57 MG/DL
HGB BLD-MCNC: 12.9 G/DL
IMM GRANULOCYTES NFR BLD AUTO: 0.2 %
LDLC SERPL CALC-MCNC: 173 MG/DL
LYMPHOCYTES # BLD AUTO: 2.51 K/UL
LYMPHOCYTES NFR BLD AUTO: 41.6 %
MAN DIFF?: NORMAL
MCHC RBC-ENTMCNC: 30.1 PG
MCHC RBC-ENTMCNC: 33.7 GM/DL
MCV RBC AUTO: 89.3 FL
MONOCYTES # BLD AUTO: 0.41 K/UL
MONOCYTES NFR BLD AUTO: 6.8 %
NEUTROPHILS # BLD AUTO: 2.87 K/UL
NEUTROPHILS NFR BLD AUTO: 47.6 %
NONHDLC SERPL-MCNC: 204 MG/DL
PLATELET # BLD AUTO: 258 K/UL
POTASSIUM SERPL-SCNC: 4.3 MMOL/L
PROT SERPL-MCNC: 7.3 G/DL
RBC # BLD: 4.29 M/UL
RBC # FLD: 13.8 %
SODIUM SERPL-SCNC: 143 MMOL/L
T4 FREE SERPL-MCNC: 1.1 NG/DL
TRIGL SERPL-MCNC: 156 MG/DL
TSH SERPL-ACNC: 1.2 UIU/ML
WBC # FLD AUTO: 6.03 K/UL

## 2021-11-05 ENCOUNTER — RX RENEWAL (OUTPATIENT)
Age: 56
End: 2021-11-05

## 2021-12-19 ENCOUNTER — RX RENEWAL (OUTPATIENT)
Age: 56
End: 2021-12-19

## 2022-01-23 ENCOUNTER — RX RENEWAL (OUTPATIENT)
Age: 57
End: 2022-01-23

## 2022-02-14 ENCOUNTER — APPOINTMENT (OUTPATIENT)
Dept: FAMILY MEDICINE | Facility: CLINIC | Age: 57
End: 2022-02-14
Payer: COMMERCIAL

## 2022-02-14 PROCEDURE — 99442: CPT

## 2022-03-01 ENCOUNTER — APPOINTMENT (OUTPATIENT)
Dept: RADIOLOGY | Facility: CLINIC | Age: 57
End: 2022-03-01
Payer: COMMERCIAL

## 2022-03-01 ENCOUNTER — APPOINTMENT (OUTPATIENT)
Dept: FAMILY MEDICINE | Facility: CLINIC | Age: 57
End: 2022-03-01
Payer: COMMERCIAL

## 2022-03-01 VITALS
TEMPERATURE: 97.2 F | OXYGEN SATURATION: 98 % | SYSTOLIC BLOOD PRESSURE: 112 MMHG | BODY MASS INDEX: 18.8 KG/M2 | HEART RATE: 70 BPM | WEIGHT: 117 LBS | HEIGHT: 66 IN | DIASTOLIC BLOOD PRESSURE: 72 MMHG

## 2022-03-01 DIAGNOSIS — M25.512 PAIN IN LEFT SHOULDER: ICD-10-CM

## 2022-03-01 DIAGNOSIS — M25.571 PAIN IN RIGHT ANKLE AND JOINTS OF RIGHT FOOT: ICD-10-CM

## 2022-03-01 PROCEDURE — 73620 X-RAY EXAM OF FOOT: CPT | Mod: RT

## 2022-03-01 PROCEDURE — 99213 OFFICE O/P EST LOW 20 MIN: CPT

## 2022-03-01 NOTE — PLAN
[FreeTextEntry1] : # Right toe pain\par - pain with flexion, discomfort with weight bearing on exam, \par - will obtain right foot x ray r/o fracture \par - continue rest and tylenol prn for pain \par - continue ice, and lower extremity elevation\par - patient to stay out of work until friday, given note \par  \par \par # Left shoulder pain\par - continue rest \par - continue tylenol for pain\par

## 2022-03-01 NOTE — PHYSICAL EXAM
[Normal] : normal rate, regular rhythm, normal S1 and S2 and no murmur heard [No Joint Swelling] : no joint swelling [Grossly Normal Strength/Tone] : grossly normal strength/tone [de-identified] : + dorsal right foot first digit tenderness to palpation, + left arm tenderness to palpation, otherwise fROM of UE bilaterally

## 2022-03-01 NOTE — HISTORY OF PRESENT ILLNESS
[FreeTextEntry8] : 55 yo F here s/p toe injury. bought new shelf after moving into the new home. when climbing to adjust the shelving, she fell on her left side, stubbing her toe in the process. 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 she took two days off from work, but was transferred to the ER for a shift. she was feeling better, but the ER shift requires a lot of movement and it is too busy, more than she can handle. she has been taking tylenol twice a day. she notes improvement of pain after resting for two days. she needed a note to stay home another 2 days. \par she is a PCA. \par \par She is admits to tolerating weight bearing, and notes improvement in pain after rest but would like some more time for rest due to high physical demand at work. \par

## 2022-03-18 ENCOUNTER — RX RENEWAL (OUTPATIENT)
Age: 57
End: 2022-03-18

## 2022-03-24 ENCOUNTER — RX RENEWAL (OUTPATIENT)
Age: 57
End: 2022-03-24

## 2022-06-17 ENCOUNTER — RX RENEWAL (OUTPATIENT)
Age: 57
End: 2022-06-17

## 2022-06-17 RX ORDER — MELOXICAM 7.5 MG/1
7.5 TABLET ORAL
Qty: 60 | Refills: 2 | Status: ACTIVE | COMMUNITY
Start: 2021-09-30 | End: 1900-01-01

## 2022-08-15 ENCOUNTER — RX RENEWAL (OUTPATIENT)
Age: 57
End: 2022-08-15

## 2022-09-11 ENCOUNTER — RX RENEWAL (OUTPATIENT)
Age: 57
End: 2022-09-11

## 2022-11-09 ENCOUNTER — APPOINTMENT (OUTPATIENT)
Dept: FAMILY MEDICINE | Facility: CLINIC | Age: 57
End: 2022-11-09

## 2022-11-09 ENCOUNTER — NON-APPOINTMENT (OUTPATIENT)
Age: 57
End: 2022-11-09

## 2022-11-09 VITALS
BODY MASS INDEX: 18.8 KG/M2 | HEART RATE: 71 BPM | SYSTOLIC BLOOD PRESSURE: 116 MMHG | OXYGEN SATURATION: 90 % | HEIGHT: 66 IN | TEMPERATURE: 97.3 F | DIASTOLIC BLOOD PRESSURE: 77 MMHG | WEIGHT: 117 LBS

## 2022-11-09 DIAGNOSIS — R22.1 LOCALIZED SWELLING, MASS AND LUMP, NECK: ICD-10-CM

## 2022-11-09 DIAGNOSIS — M25.50 PAIN IN UNSPECIFIED JOINT: ICD-10-CM

## 2022-11-09 PROCEDURE — 93000 ELECTROCARDIOGRAM COMPLETE: CPT

## 2022-11-09 PROCEDURE — 99396 PREV VISIT EST AGE 40-64: CPT | Mod: 25

## 2022-11-09 PROCEDURE — 99212 OFFICE O/P EST SF 10 MIN: CPT | Mod: 25

## 2022-11-12 PROBLEM — R22.1 NECK FULLNESS: Status: ACTIVE | Noted: 2017-06-14

## 2022-11-12 NOTE — HISTORY OF PRESENT ILLNESS
[de-identified] : \par Patient presents for physical exam\par She notes that it has been a busy and stressful year with her children and she has not pursued the evaluations recommended during the last visit \par Continues to experience back pain- has seen ortho spine\par Continues to experience significant gas\par Also continues to experience diffuse arthralgia

## 2022-11-12 NOTE — PLAN
[FreeTextEntry1] : \par HCM:\par Obtain labs\par COVID booster, flu shot and Shingrix\par Breast imaging\par Follow up with GI for colonoscopy and abdominal discomfort\par Healthy diet, exercise and sleep\par \par Arthralgia:\par Rheum labs and eval\par \par EKG abnormality:\par Cardio eval\par \par Neck fullness:\par US neck\par

## 2022-11-12 NOTE — HEALTH RISK ASSESSMENT
[0] : 2) Feeling down, depressed, or hopeless: Not at all (0) [PHQ-2 Negative - No further assessment needed] : PHQ-2 Negative - No further assessment needed [Employed] : employed [] :  [[id number]] : 0 [MammogramDate] : 11/20 [ColonoscopyDate] : 04/17

## 2022-11-12 NOTE — PHYSICAL EXAM
[Normal] : no acute distress, well nourished, well developed and well-appearing [Normal Sclera/Conjunctiva] : normal sclera/conjunctiva [PERRL] : pupils equal round and reactive to light [Normal Outer Ear/Nose] : the outer ears and nose were normal in appearance [Normal Oropharynx] : the oropharynx was normal [Normal TMs] : both tympanic membranes were normal [No JVD] : no jugular venous distention [Supple] : supple [No Respiratory Distress] : no respiratory distress  [No Accessory Muscle Use] : no accessory muscle use [Clear to Auscultation] : lungs were clear to auscultation bilaterally [Normal Rate] : normal rate  [Regular Rhythm] : with a regular rhythm [Normal S1, S2] : normal S1 and S2 [Pedal Pulses Present] : the pedal pulses are present [No Edema] : there was no peripheral edema [Soft] : abdomen soft [Non Tender] : non-tender [Non-distended] : non-distended [Normal Supraclavicular Nodes] : no supraclavicular lymphadenopathy [Normal Posterior Cervical Nodes] : no posterior cervical lymphadenopathy [Normal Anterior Cervical Nodes] : no anterior cervical lymphadenopathy [No Spinal Tenderness] : no spinal tenderness [Grossly Normal Strength/Tone] : grossly normal strength/tone [No Rash] : no rash [Coordination Grossly Intact] : coordination grossly intact [Normal Gait] : normal gait [Normal Affect] : the affect was normal [Normal Insight/Judgement] : insight and judgment were intact [de-identified] : neck fullness

## 2022-12-06 ENCOUNTER — RX RENEWAL (OUTPATIENT)
Age: 57
End: 2022-12-06

## 2022-12-20 LAB
ALBUMIN SERPL ELPH-MCNC: 4.6 G/DL
ALP BLD-CCNC: 104 U/L
ALT SERPL-CCNC: 33 U/L
ANA PAT FLD IF-IMP: NORMAL
ANA SER IF-ACNC: ABNORMAL
ANION GAP SERPL CALC-SCNC: 12 MMOL/L
AST SERPL-CCNC: 26 U/L
BASOPHILS # BLD AUTO: 0.02 K/UL
BASOPHILS NFR BLD AUTO: 0.3 %
BILIRUB SERPL-MCNC: 0.2 MG/DL
BUN SERPL-MCNC: 11 MG/DL
CALCIUM SERPL-MCNC: 9.7 MG/DL
CHLORIDE SERPL-SCNC: 104 MMOL/L
CHOLEST SERPL-MCNC: 246 MG/DL
CO2 SERPL-SCNC: 23 MMOL/L
CREAT SERPL-MCNC: 0.64 MG/DL
CRP SERPL-MCNC: <3 MG/L
EGFR: 103 ML/MIN/1.73M2
EOSINOPHIL # BLD AUTO: 0.26 K/UL
EOSINOPHIL NFR BLD AUTO: 4.4 %
ERYTHROCYTE [SEDIMENTATION RATE] IN BLOOD BY WESTERGREN METHOD: 8 MM/HR
ESTIMATED AVERAGE GLUCOSE: 126 MG/DL
GLUCOSE SERPL-MCNC: 106 MG/DL
HBA1C MFR BLD HPLC: 6 %
HCT VFR BLD CALC: 37.9 %
HDLC SERPL-MCNC: 49 MG/DL
HGB BLD-MCNC: 12.4 G/DL
IMM GRANULOCYTES NFR BLD AUTO: 0.3 %
LDLC SERPL CALC-MCNC: 159 MG/DL
LYMPHOCYTES # BLD AUTO: 2.42 K/UL
LYMPHOCYTES NFR BLD AUTO: 41.2 %
MAN DIFF?: NORMAL
MCHC RBC-ENTMCNC: 29.8 PG
MCHC RBC-ENTMCNC: 32.7 GM/DL
MCV RBC AUTO: 91.1 FL
MONOCYTES # BLD AUTO: 0.4 K/UL
MONOCYTES NFR BLD AUTO: 6.8 %
NEUTROPHILS # BLD AUTO: 2.75 K/UL
NEUTROPHILS NFR BLD AUTO: 47 %
NONHDLC SERPL-MCNC: 198 MG/DL
PLATELET # BLD AUTO: 259 K/UL
POTASSIUM SERPL-SCNC: 4.2 MMOL/L
PROT SERPL-MCNC: 7.3 G/DL
RBC # BLD: 4.16 M/UL
RBC # FLD: 13.7 %
RHEUMATOID FACT SER QL: <10 IU/ML
SODIUM SERPL-SCNC: 139 MMOL/L
T4 FREE SERPL-MCNC: 1.1 NG/DL
TRIGL SERPL-MCNC: 196 MG/DL
TSH SERPL-ACNC: 1.95 UIU/ML
WBC # FLD AUTO: 5.87 K/UL

## 2023-01-26 ENCOUNTER — NON-APPOINTMENT (OUTPATIENT)
Age: 58
End: 2023-01-26

## 2023-03-10 ENCOUNTER — RX RENEWAL (OUTPATIENT)
Age: 58
End: 2023-03-10

## 2023-06-29 ENCOUNTER — RX RENEWAL (OUTPATIENT)
Age: 58
End: 2023-06-29

## 2023-09-21 ENCOUNTER — RX RENEWAL (OUTPATIENT)
Age: 58
End: 2023-09-21

## 2023-11-29 ENCOUNTER — NON-APPOINTMENT (OUTPATIENT)
Age: 58
End: 2023-11-29

## 2023-11-29 ENCOUNTER — APPOINTMENT (OUTPATIENT)
Dept: FAMILY MEDICINE | Facility: CLINIC | Age: 58
End: 2023-11-29
Payer: COMMERCIAL

## 2023-11-29 VITALS
DIASTOLIC BLOOD PRESSURE: 71 MMHG | HEIGHT: 66 IN | OXYGEN SATURATION: 96 % | HEART RATE: 71 BPM | SYSTOLIC BLOOD PRESSURE: 128 MMHG | TEMPERATURE: 98.1 F | WEIGHT: 175 LBS | BODY MASS INDEX: 28.12 KG/M2

## 2023-11-29 DIAGNOSIS — R10.9 UNSPECIFIED ABDOMINAL PAIN: ICD-10-CM

## 2023-11-29 DIAGNOSIS — R74.8 ABNORMAL LEVELS OF OTHER SERUM ENZYMES: ICD-10-CM

## 2023-11-29 DIAGNOSIS — E78.00 PURE HYPERCHOLESTEROLEMIA, UNSPECIFIED: ICD-10-CM

## 2023-11-29 DIAGNOSIS — R94.31 ABNORMAL ELECTROCARDIOGRAM [ECG] [EKG]: ICD-10-CM

## 2023-11-29 DIAGNOSIS — R76.8 OTHER SPECIFIED ABNORMAL IMMUNOLOGICAL FINDINGS IN SERUM: ICD-10-CM

## 2023-11-29 DIAGNOSIS — Z00.00 ENCOUNTER FOR GENERAL ADULT MEDICAL EXAMINATION W/OUT ABNORMAL FINDINGS: ICD-10-CM

## 2023-11-29 DIAGNOSIS — R07.89 OTHER CHEST PAIN: ICD-10-CM

## 2023-11-29 LAB
ALBUMIN SERPL ELPH-MCNC: 4.4 G/DL
ALP BLD-CCNC: 108 U/L
ALT SERPL-CCNC: 54 U/L
ANION GAP SERPL CALC-SCNC: 11 MMOL/L
AST SERPL-CCNC: 48 U/L
BILIRUB SERPL-MCNC: 0.4 MG/DL
BUN SERPL-MCNC: 9 MG/DL
CALCIUM SERPL-MCNC: 9.5 MG/DL
CHLORIDE SERPL-SCNC: 107 MMOL/L
CHOLEST SERPL-MCNC: 219 MG/DL
CO2 SERPL-SCNC: 25 MMOL/L
CREAT SERPL-MCNC: 0.61 MG/DL
EGFR: 104 ML/MIN/1.73M2
ESTIMATED AVERAGE GLUCOSE: 126 MG/DL
GLUCOSE SERPL-MCNC: 96 MG/DL
HBA1C MFR BLD HPLC: 6 %
HCT VFR BLD CALC: 38.3 %
HDLC SERPL-MCNC: 48 MG/DL
HGB BLD-MCNC: 12.4 G/DL
LDLC SERPL CALC-MCNC: 139 MG/DL
MCHC RBC-ENTMCNC: 29.1 PG
MCHC RBC-ENTMCNC: 32.4 GM/DL
MCV RBC AUTO: 89.9 FL
NONHDLC SERPL-MCNC: 171 MG/DL
PLATELET # BLD AUTO: 252 K/UL
POTASSIUM SERPL-SCNC: 4 MMOL/L
PROT SERPL-MCNC: 7 G/DL
RBC # BLD: 4.26 M/UL
RBC # FLD: 14.3 %
SODIUM SERPL-SCNC: 142 MMOL/L
T4 FREE SERPL-MCNC: 1.2 NG/DL
TRIGL SERPL-MCNC: 179 MG/DL
TSH SERPL-ACNC: 1.08 UIU/ML
WBC # FLD AUTO: 6.05 K/UL

## 2023-11-29 PROCEDURE — 99213 OFFICE O/P EST LOW 20 MIN: CPT | Mod: 25

## 2023-11-29 PROCEDURE — 93000 ELECTROCARDIOGRAM COMPLETE: CPT

## 2023-11-29 PROCEDURE — 99396 PREV VISIT EST AGE 40-64: CPT | Mod: 25

## 2023-11-29 RX ORDER — ZOSTER VACCINE RECOMBINANT, ADJUVANTED 50 MCG/0.5
50 KIT INTRAMUSCULAR
Qty: 2 | Refills: 0 | Status: COMPLETED | COMMUNITY
Start: 2022-11-09 | End: 2023-11-29

## 2023-12-22 ENCOUNTER — RX RENEWAL (OUTPATIENT)
Age: 58
End: 2023-12-22

## 2023-12-22 ENCOUNTER — APPOINTMENT (OUTPATIENT)
Dept: CARDIOLOGY | Facility: CLINIC | Age: 58
End: 2023-12-22
Payer: COMMERCIAL

## 2023-12-22 ENCOUNTER — NON-APPOINTMENT (OUTPATIENT)
Age: 58
End: 2023-12-22

## 2023-12-22 VITALS
SYSTOLIC BLOOD PRESSURE: 111 MMHG | BODY MASS INDEX: 28.12 KG/M2 | DIASTOLIC BLOOD PRESSURE: 66 MMHG | OXYGEN SATURATION: 98 % | HEIGHT: 66 IN | RESPIRATION RATE: 16 BRPM | WEIGHT: 175 LBS | TEMPERATURE: 97.9 F | HEART RATE: 67 BPM

## 2023-12-22 DIAGNOSIS — E03.9 HYPOTHYROIDISM, UNSPECIFIED: ICD-10-CM

## 2023-12-22 PROCEDURE — 93000 ELECTROCARDIOGRAM COMPLETE: CPT

## 2023-12-22 PROCEDURE — 99215 OFFICE O/P EST HI 40 MIN: CPT | Mod: 25

## 2023-12-22 NOTE — REASON FOR VISIT
[Hyperlipidemia] : hyperlipidemia [Other: ____] : [unfilled] [FreeTextEntry1] : 58 years old female with dyslipidemia and hypothyroidism was referred to me for cardiac evaluation because of chest pain and pressure in the upper part of the chest.  The pain apparently does not last that long-the pain is described as sharp pain.  There is mild shortness of breath

## 2023-12-22 NOTE — DISCUSSION/SUMMARY
[FreeTextEntry1] : EKG done revealed regular sinus rhythm poor R wave progression across the anterior leads-rule out old anterior MI.  Patient was advised for coronary CTA for further evaluation of her cardiac status.  Thanks for kind referral

## 2024-01-06 PROBLEM — R94.31 EKG ABNORMALITY: Status: ACTIVE | Noted: 2022-11-09

## 2024-01-06 PROBLEM — R07.89 CHEST PRESSURE: Status: ACTIVE | Noted: 2023-11-29

## 2024-01-06 PROBLEM — R74.8 ELEVATED LIVER ENZYMES: Status: ACTIVE | Noted: 2023-11-29

## 2024-01-06 NOTE — DATA REVIEWED
[FreeTextEntry1] :  EKG- sinus @ 60 bpm; occasional ectopic ventricular beat; nonspecific abnormalities

## 2024-01-06 NOTE — REVIEW OF SYSTEMS
[Abdominal Pain] : abdominal pain [Joint Pain] : joint pain [Back Pain] : back pain [Negative] : Heme/Lymph [FreeTextEntry5] : see hpi

## 2024-01-06 NOTE — PHYSICAL EXAM
[Normal Sclera/Conjunctiva] : normal sclera/conjunctiva [PERRL] : pupils equal round and reactive to light [Normal Outer Ear/Nose] : the outer ears and nose were normal in appearance [Normal Oropharynx] : the oropharynx was normal [Normal TMs] : both tympanic membranes were normal [No JVD] : no jugular venous distention [No Lymphadenopathy] : no lymphadenopathy [Supple] : supple [No Respiratory Distress] : no respiratory distress  [No Accessory Muscle Use] : no accessory muscle use [Clear to Auscultation] : lungs were clear to auscultation bilaterally [Normal Rate] : normal rate  [Regular Rhythm] : with a regular rhythm [Normal S1, S2] : normal S1 and S2 [Pedal Pulses Present] : the pedal pulses are present [No Edema] : there was no peripheral edema [Soft] : abdomen soft [Non-distended] : non-distended [Normal Bowel Sounds] : normal bowel sounds [Normal Supraclavicular Nodes] : no supraclavicular lymphadenopathy [Normal Posterior Cervical Nodes] : no posterior cervical lymphadenopathy [Normal Anterior Cervical Nodes] : no anterior cervical lymphadenopathy [No Spinal Tenderness] : no spinal tenderness [Grossly Normal Strength/Tone] : grossly normal strength/tone [Coordination Grossly Intact] : coordination grossly intact [Normal Gait] : normal gait [Normal Affect] : the affect was normal [Normal Insight/Judgement] : insight and judgment were intact [de-identified] : VS: T 98.1; Ht 66; Wt 175 lb; O2 96%; /71; HR 71 [de-identified] : mild abdominal tenderness

## 2024-01-06 NOTE — HEALTH RISK ASSESSMENT
[No] : No [0] : 2) Feeling down, depressed, or hopeless: Not at all (0) [PHQ-2 Negative - No further assessment needed] : PHQ-2 Negative - No further assessment needed [Employed] : employed [] :  [Never] : Never [[id number]] : 0 [Sexually Active] : not sexually active [MammogramDate] : 11/00 [ColonoscopyDate] : 04/17

## 2024-01-06 NOTE — HISTORY OF PRESENT ILLNESS
[de-identified] :  Patient presents for physical exam She has been doing well but did not have a chance to pursue the specialists previously recommended.  Had flu and covid vaccines. Did not have shingles vaccine  Notes persistent chronic chest pressure and joint pain

## 2024-01-06 NOTE — PLAN
[FreeTextEntry1] :  HCM: Labs reviewed Gyn eval Mammogram and US Shingrix  Abdominal wall pain, elevated LFTs: CT abdomen Additional labs Follow up with GI  Chest pressure: EKG abnormality Cardio eval  Hyperlipidemia: Restart statin Healthy diet   Positive HELADIO and hx of joint pain: Rhem eval

## 2024-01-19 ENCOUNTER — RESULT REVIEW (OUTPATIENT)
Age: 59
End: 2024-01-19

## 2024-01-19 ENCOUNTER — APPOINTMENT (OUTPATIENT)
Dept: ULTRASOUND IMAGING | Facility: CLINIC | Age: 59
End: 2024-01-19
Payer: COMMERCIAL

## 2024-01-19 ENCOUNTER — APPOINTMENT (OUTPATIENT)
Dept: CT IMAGING | Facility: CLINIC | Age: 59
End: 2024-01-19
Payer: COMMERCIAL

## 2024-01-19 ENCOUNTER — OUTPATIENT (OUTPATIENT)
Dept: OUTPATIENT SERVICES | Facility: HOSPITAL | Age: 59
LOS: 1 days | End: 2024-01-19
Payer: COMMERCIAL

## 2024-01-19 ENCOUNTER — APPOINTMENT (OUTPATIENT)
Dept: MAMMOGRAPHY | Facility: CLINIC | Age: 59
End: 2024-01-19
Payer: COMMERCIAL

## 2024-01-19 DIAGNOSIS — Z00.00 ENCOUNTER FOR GENERAL ADULT MEDICAL EXAMINATION WITHOUT ABNORMAL FINDINGS: ICD-10-CM

## 2024-01-19 DIAGNOSIS — Z90.49 ACQUIRED ABSENCE OF OTHER SPECIFIED PARTS OF DIGESTIVE TRACT: Chronic | ICD-10-CM

## 2024-01-19 DIAGNOSIS — Z98.890 OTHER SPECIFIED POSTPROCEDURAL STATES: Chronic | ICD-10-CM

## 2024-01-19 PROCEDURE — 77067 SCR MAMMO BI INCL CAD: CPT | Mod: 26

## 2024-01-19 PROCEDURE — 77063 BREAST TOMOSYNTHESIS BI: CPT | Mod: 26

## 2024-01-19 PROCEDURE — 74150 CT ABDOMEN W/O CONTRAST: CPT | Mod: 26

## 2024-01-19 PROCEDURE — 76641 ULTRASOUND BREAST COMPLETE: CPT | Mod: 26,50

## 2024-01-19 PROCEDURE — 74150 CT ABDOMEN W/O CONTRAST: CPT

## 2024-01-19 PROCEDURE — 77063 BREAST TOMOSYNTHESIS BI: CPT

## 2024-01-19 PROCEDURE — 76641 ULTRASOUND BREAST COMPLETE: CPT

## 2024-01-19 PROCEDURE — 77067 SCR MAMMO BI INCL CAD: CPT

## 2024-01-26 ENCOUNTER — APPOINTMENT (OUTPATIENT)
Dept: CT IMAGING | Facility: CLINIC | Age: 59
End: 2024-01-26
Payer: COMMERCIAL

## 2024-01-26 PROCEDURE — 75574 CT ANGIO HRT W/3D IMAGE: CPT

## 2024-01-30 LAB
ALBUMIN SERPL ELPH-MCNC: 4.3 G/DL
ALP BLD-CCNC: 109 U/L
ALT SERPL-CCNC: 66 U/L
ANION GAP SERPL CALC-SCNC: 11 MMOL/L
AST SERPL-CCNC: 39 U/L
BILIRUB SERPL-MCNC: 0.2 MG/DL
BUN SERPL-MCNC: 12 MG/DL
CALCIUM SERPL-MCNC: 9.5 MG/DL
CHLORIDE SERPL-SCNC: 106 MMOL/L
CO2 SERPL-SCNC: 24 MMOL/L
CREAT SERPL-MCNC: 0.66 MG/DL
EGFR: 102 ML/MIN/1.73M2
GLUCOSE SERPL-MCNC: 103 MG/DL
HAV IGM SER QL: NONREACTIVE
HBV CORE IGM SER QL: NONREACTIVE
HBV SURFACE AG SER QL: NONREACTIVE
HCV AB SER QL: NONREACTIVE
HCV S/CO RATIO: 0.09 S/CO
POTASSIUM SERPL-SCNC: 4.4 MMOL/L
PROT SERPL-MCNC: 7 G/DL
SODIUM SERPL-SCNC: 142 MMOL/L

## 2024-02-01 ENCOUNTER — NON-APPOINTMENT (OUTPATIENT)
Age: 59
End: 2024-02-01

## 2024-02-14 ENCOUNTER — APPOINTMENT (OUTPATIENT)
Dept: RHEUMATOLOGY | Facility: CLINIC | Age: 59
End: 2024-02-14
Payer: COMMERCIAL

## 2024-02-14 ENCOUNTER — LABORATORY RESULT (OUTPATIENT)
Age: 59
End: 2024-02-14

## 2024-02-14 VITALS
HEIGHT: 66 IN | BODY MASS INDEX: 28.12 KG/M2 | HEART RATE: 70 BPM | TEMPERATURE: 98.2 F | OXYGEN SATURATION: 96 % | DIASTOLIC BLOOD PRESSURE: 77 MMHG | SYSTOLIC BLOOD PRESSURE: 118 MMHG | RESPIRATION RATE: 16 BRPM | WEIGHT: 175 LBS

## 2024-02-14 DIAGNOSIS — M79.10 MYALGIA, UNSPECIFIED SITE: ICD-10-CM

## 2024-02-14 PROCEDURE — 99204 OFFICE O/P NEW MOD 45 MIN: CPT

## 2024-02-14 RX ORDER — TIZANIDINE 4 MG/1
4 TABLET ORAL
Qty: 30 | Refills: 0 | Status: ACTIVE | COMMUNITY
Start: 2024-02-14 | End: 1900-01-01

## 2024-02-16 ENCOUNTER — TRANSCRIPTION ENCOUNTER (OUTPATIENT)
Age: 59
End: 2024-02-16

## 2024-02-16 LAB
ALBUMIN SERPL ELPH-MCNC: 4.5 G/DL
ALP BLD-CCNC: 95 U/L
ALT SERPL-CCNC: 19 U/L
ANION GAP SERPL CALC-SCNC: 13 MMOL/L
AST SERPL-CCNC: 20 U/L
BASOPHILS # BLD AUTO: 0.02 K/UL
BASOPHILS NFR BLD AUTO: 0.3 %
BILIRUB SERPL-MCNC: 0.2 MG/DL
BUN SERPL-MCNC: 9 MG/DL
C3 SERPL-MCNC: 124 MG/DL
C4 SERPL-MCNC: 34 MG/DL
CALCIUM SERPL-MCNC: 9.6 MG/DL
CCP AB SER IA-ACNC: <8 UNITS
CHLORIDE SERPL-SCNC: 105 MMOL/L
CK SERPL-CCNC: 111 U/L
CO2 SERPL-SCNC: 23 MMOL/L
CREAT SERPL-MCNC: 0.63 MG/DL
CRP SERPL-MCNC: <3 MG/L
EGFR: 103 ML/MIN/1.73M2
ENA RNP AB SER IA-ACNC: 0.2 AL
ENA SM AB SER IA-ACNC: <0.2 AL
ENA SS-A AB SER IA-ACNC: <0.2 AL
ENA SS-B AB SER IA-ACNC: <0.2 AL
EOSINOPHIL # BLD AUTO: 0.21 K/UL
EOSINOPHIL NFR BLD AUTO: 3.5 %
ERYTHROCYTE [SEDIMENTATION RATE] IN BLOOD BY WESTERGREN METHOD: 15 MM/HR
GLUCOSE SERPL-MCNC: 103 MG/DL
HCT VFR BLD CALC: 36.8 %
HGB BLD-MCNC: 12.1 G/DL
IMM GRANULOCYTES NFR BLD AUTO: 0.2 %
LYMPHOCYTES # BLD AUTO: 2.67 K/UL
LYMPHOCYTES NFR BLD AUTO: 44.9 %
MAN DIFF?: NORMAL
MCHC RBC-ENTMCNC: 29.4 PG
MCHC RBC-ENTMCNC: 32.9 GM/DL
MCV RBC AUTO: 89.5 FL
MONOCYTES # BLD AUTO: 0.36 K/UL
MONOCYTES NFR BLD AUTO: 6.1 %
NEUTROPHILS # BLD AUTO: 2.68 K/UL
NEUTROPHILS NFR BLD AUTO: 45 %
PLATELET # BLD AUTO: 242 K/UL
POTASSIUM SERPL-SCNC: 4 MMOL/L
PROT SERPL-MCNC: 6.9 G/DL
RBC # BLD: 4.11 M/UL
RBC # FLD: 13.8 %
RF+CCP IGG SER-IMP: NEGATIVE
RHEUMATOID FACT SER QL: <10 IU/ML
SODIUM SERPL-SCNC: 141 MMOL/L
THYROGLOB AB SERPL-ACNC: <20 IU/ML
THYROPEROXIDASE AB SERPL IA-ACNC: 141 IU/ML
TSH SERPL-ACNC: 1.87 UIU/ML
WBC # FLD AUTO: 5.95 K/UL

## 2024-02-20 ENCOUNTER — TRANSCRIPTION ENCOUNTER (OUTPATIENT)
Age: 59
End: 2024-02-20

## 2024-02-26 LAB
ALBUMIN MFR SERPL ELPH: 59.6 %
ALBUMIN SERPL-MCNC: 4.1 G/DL
ALBUMIN/GLOB SERPL: 1.5 RATIO
ALPHA1 GLOB MFR SERPL ELPH: 3.5 %
ALPHA1 GLOB SERPL ELPH-MCNC: 0.2 G/DL
ALPHA2 GLOB MFR SERPL ELPH: 9.8 %
ALPHA2 GLOB SERPL ELPH-MCNC: 0.7 G/DL
ANA PAT FLD IF-IMP: ABNORMAL
ANA PATTERN: ABNORMAL
ANA SER IF-ACNC: ABNORMAL
ANA TITER: ABNORMAL
B-GLOBULIN MFR SERPL ELPH: 12.5 %
B-GLOBULIN SERPL ELPH-MCNC: 0.9 G/DL
DSDNA AB SER-ACNC: <12 IU/ML
G6PD SER-CCNC: 16.3 U/G HGB
GAMMA GLOB FLD ELPH-MCNC: 1 G/DL
GAMMA GLOB MFR SERPL ELPH: 14.6 %
INTERPRETATION SERPL IEP-IMP: NORMAL
M PROTEIN SPEC IFE-MCNC: NORMAL
PROT SERPL-MCNC: 6.9 G/DL
PROT SERPL-MCNC: 6.9 G/DL

## 2024-03-06 ENCOUNTER — NON-APPOINTMENT (OUTPATIENT)
Age: 59
End: 2024-03-06

## 2024-03-26 ENCOUNTER — RX RENEWAL (OUTPATIENT)
Age: 59
End: 2024-03-26

## 2024-04-05 ENCOUNTER — APPOINTMENT (OUTPATIENT)
Dept: OTOLARYNGOLOGY | Facility: CLINIC | Age: 59
End: 2024-04-05
Payer: COMMERCIAL

## 2024-04-05 ENCOUNTER — NON-APPOINTMENT (OUTPATIENT)
Age: 59
End: 2024-04-05

## 2024-04-05 VITALS
HEART RATE: 71 BPM | DIASTOLIC BLOOD PRESSURE: 82 MMHG | BODY MASS INDEX: 28.61 KG/M2 | HEIGHT: 66 IN | SYSTOLIC BLOOD PRESSURE: 119 MMHG | WEIGHT: 178 LBS

## 2024-04-05 DIAGNOSIS — J31.0 CHRONIC RHINITIS: ICD-10-CM

## 2024-04-05 DIAGNOSIS — K14.8 OTHER DISEASES OF TONGUE: ICD-10-CM

## 2024-04-05 DIAGNOSIS — J34.2 DEVIATED NASAL SEPTUM: ICD-10-CM

## 2024-04-05 PROCEDURE — 99203 OFFICE O/P NEW LOW 30 MIN: CPT | Mod: 25

## 2024-04-05 PROCEDURE — 41110 EXCISION OF TONGUE LESION: CPT

## 2024-04-05 NOTE — PHYSICAL EXAM
[] : septum deviated bilaterally [Midline] : trachea located in midline position [Normal] : orientation to person, place, and time: normal [Hearing Loss Right Only] : normal [Hearing Loss Left Only] : normal [de-identified] :  mildly inflamed turbinates [de-identified] : lesion at the tip of the tongue [de-identified] : class 1

## 2024-04-05 NOTE — PROCEDURE
[FreeTextEntry1] : Excision of tongue lesion [FreeTextEntry2] : tongue lesion [FreeTextEntry3] : After informed consent, the tongue was sprayed with Hurricane spray. Once sufficiently anesthetized, a total of 2.8 cc of 2% xylocaine with 1:100,000% Epinephrine was injected into the tongue. Once sufficiently anesthetized, scissors were used to excise the raised lesion from the tongue. Suction was used to remove blood after the excision. The tongue was then cauterized with electro-cautery. Pathology taken from the excised tongue and sent to lab. The patient tolerated the procedure without difficulty, and no complications were encountered. Post procedure instructions provided.

## 2024-04-05 NOTE — HISTORY OF PRESENT ILLNESS
[de-identified] : Patient presents today stating that there is a bump/growth on her tongue after biting it. She states that she bit at a bubble on her tongue often that would usually pop, but this time it did not.

## 2024-04-05 NOTE — CONSULT LETTER
[Dear  ___] : Dear  [unfilled], [Consult Letter:] : I had the pleasure of evaluating your patient, [unfilled]. [Please see my note below.] : Please see my note below. [Consult Closing:] : Thank you very much for allowing me to participate in the care of this patient.  If you have any questions, please do not hesitate to contact me. [Sincerely,] : Sincerely, [FreeTextEntry3] :  Sidney Maciel MD FACS

## 2024-04-05 NOTE — ASSESSMENT
[FreeTextEntry1] :  Reviewed and reconciled medications, allergies, PMHx, PSHx, SocHx, FMHx.  Patient presents today stating that there is a bump/growth on her tongue after biting it. She states that she bit at a bubble on her tongue often that would usually pop, but this time it did not.  Physical exam: -deviated septum R>L -mildly inflamed turbinates -lesion at the tip of the tongue -class 1 tonsils  ENT Procedure: Excision of tongue lesion  Plan: -Appl ice to the tongue -Use Aspirin as needed -FU as needed

## 2024-04-05 NOTE — ADDENDUM
[FreeTextEntry1] :  Documented by Gabino Becker acting as scribe for Dr. Maciel on 04/05/2024. All Medical record entries made by the Scribe were at my, Dr. Maciel, direction and personally dictated by me on 04/05/2024 . I have reviewed the chart and agree that the record accurately reflects my personal performance of the history, physical exam, assessment and plan. I have also personally directed, reviewed, and agreed with the discharge instructions.

## 2024-04-12 LAB — CORE LAB BIOPSY: NORMAL

## 2024-05-28 ENCOUNTER — RX RENEWAL (OUTPATIENT)
Age: 59
End: 2024-05-28

## 2024-05-28 RX ORDER — LEVOTHYROXINE SODIUM 0.07 MG/1
75 TABLET ORAL DAILY
Qty: 30 | Refills: 2 | Status: ACTIVE | COMMUNITY
Start: 2017-04-27 | End: 1900-01-01

## 2024-07-15 ENCOUNTER — TRANSCRIPTION ENCOUNTER (OUTPATIENT)
Age: 59
End: 2024-07-15

## 2024-07-19 ENCOUNTER — APPOINTMENT (OUTPATIENT)
Dept: RHEUMATOLOGY | Facility: CLINIC | Age: 59
End: 2024-07-19

## 2024-08-21 ENCOUNTER — APPOINTMENT (OUTPATIENT)
Dept: FAMILY MEDICINE | Facility: CLINIC | Age: 59
End: 2024-08-21

## 2024-08-21 VITALS
OXYGEN SATURATION: 98 % | HEART RATE: 70 BPM | WEIGHT: 175 LBS | HEIGHT: 66 IN | RESPIRATION RATE: 16 BRPM | TEMPERATURE: 98.7 F | BODY MASS INDEX: 28.12 KG/M2 | SYSTOLIC BLOOD PRESSURE: 109 MMHG | DIASTOLIC BLOOD PRESSURE: 71 MMHG

## 2024-08-21 DIAGNOSIS — R74.8 ABNORMAL LEVELS OF OTHER SERUM ENZYMES: ICD-10-CM

## 2024-08-21 DIAGNOSIS — L81.9 DISORDER OF PIGMENTATION, UNSPECIFIED: ICD-10-CM

## 2024-08-21 DIAGNOSIS — R11.0 NAUSEA: ICD-10-CM

## 2024-08-21 DIAGNOSIS — Z01.818 ENCOUNTER FOR OTHER PREPROCEDURAL EXAMINATION: ICD-10-CM

## 2024-08-21 PROCEDURE — 99213 OFFICE O/P EST LOW 20 MIN: CPT

## 2024-08-26 LAB
ALBUMIN SERPL ELPH-MCNC: 4.2 G/DL
ALP BLD-CCNC: 89 U/L
ALT SERPL-CCNC: 17 U/L
AMYLASE/CREAT SERPL: 56 U/L
ANION GAP SERPL CALC-SCNC: 10 MMOL/L
AST SERPL-CCNC: 18 U/L
BASOPHILS # BLD AUTO: 0.02 K/UL
BASOPHILS NFR BLD AUTO: 0.4 %
BILIRUB SERPL-MCNC: 0.2 MG/DL
BUN SERPL-MCNC: 11 MG/DL
CALCIUM SERPL-MCNC: 9.3 MG/DL
CHLORIDE SERPL-SCNC: 108 MMOL/L
CHOLEST SERPL-MCNC: 219 MG/DL
CO2 SERPL-SCNC: 23 MMOL/L
CORTIS SERPL-MCNC: 10.8 UG/DL
CREAT SERPL-MCNC: 0.58 MG/DL
EGFR: 105 ML/MIN/1.73M2
EOSINOPHIL # BLD AUTO: 0.21 K/UL
EOSINOPHIL NFR BLD AUTO: 3.8 %
ERYTHROCYTE [SEDIMENTATION RATE] IN BLOOD BY WESTERGREN METHOD: 15 MM/HR
ESTIMATED AVERAGE GLUCOSE: 128 MG/DL
GLUCOSE SERPL-MCNC: 92 MG/DL
HBA1C MFR BLD HPLC: 6.1 %
HCT VFR BLD CALC: 30.6 %
HDLC SERPL-MCNC: 49 MG/DL
HGB BLD-MCNC: 9.7 G/DL
IMM GRANULOCYTES NFR BLD AUTO: 0.2 %
INSULIN P FAST SERPL-ACNC: 13.8 UU/ML
LDLC SERPL CALC-MCNC: 141 MG/DL
LPL SERPL-CCNC: 32 U/L
LYMPHOCYTES # BLD AUTO: 2.47 K/UL
LYMPHOCYTES NFR BLD AUTO: 44.8 %
MAN DIFF?: NORMAL
MCHC RBC-ENTMCNC: 25.4 PG
MCHC RBC-ENTMCNC: 31.7 GM/DL
MCV RBC AUTO: 80.1 FL
MONOCYTES # BLD AUTO: 0.39 K/UL
MONOCYTES NFR BLD AUTO: 7.1 %
NEUTROPHILS # BLD AUTO: 2.41 K/UL
NEUTROPHILS NFR BLD AUTO: 43.7 %
NONHDLC SERPL-MCNC: 171 MG/DL
PLATELET # BLD AUTO: 282 K/UL
POTASSIUM SERPL-SCNC: 4.1 MMOL/L
PROT SERPL-MCNC: 6.6 G/DL
RBC # BLD: 3.82 M/UL
RBC # FLD: 14 %
SODIUM SERPL-SCNC: 141 MMOL/L
TRIGL SERPL-MCNC: 164 MG/DL
TSH SERPL-ACNC: 0.83 UIU/ML
WBC # FLD AUTO: 5.51 K/UL

## 2024-09-09 ENCOUNTER — TRANSCRIPTION ENCOUNTER (OUTPATIENT)
Age: 59
End: 2024-09-09

## 2024-09-10 ENCOUNTER — TRANSCRIPTION ENCOUNTER (OUTPATIENT)
Age: 59
End: 2024-09-10

## 2024-09-10 PROBLEM — K62.5 RECTAL BLEEDING: Status: ACTIVE | Noted: 2024-09-10

## 2024-09-17 ENCOUNTER — TRANSCRIPTION ENCOUNTER (OUTPATIENT)
Age: 59
End: 2024-09-17

## 2024-09-17 DIAGNOSIS — D64.9 ANEMIA, UNSPECIFIED: ICD-10-CM

## 2024-09-30 NOTE — HISTORY OF PRESENT ILLNESS
[de-identified] :  Dark pigment under arms Taking SynoCell- joint pain has resolved  Mushroom coffee one month ago Nausea after eating since November- 5 pound weight loss    3-5x a week in Sub Acute Rehab 3-4 weeks

## 2024-12-19 ENCOUNTER — APPOINTMENT (OUTPATIENT)
Dept: FAMILY MEDICINE | Facility: CLINIC | Age: 59
End: 2024-12-19
Payer: COMMERCIAL

## 2024-12-19 ENCOUNTER — NON-APPOINTMENT (OUTPATIENT)
Age: 59
End: 2024-12-19

## 2024-12-19 VITALS
HEIGHT: 66 IN | DIASTOLIC BLOOD PRESSURE: 70 MMHG | BODY MASS INDEX: 29.25 KG/M2 | SYSTOLIC BLOOD PRESSURE: 112 MMHG | OXYGEN SATURATION: 98 % | HEART RATE: 83 BPM | TEMPERATURE: 98.2 F | WEIGHT: 182 LBS

## 2024-12-19 DIAGNOSIS — M53.3 SACROCOCCYGEAL DISORDERS, NOT ELSEWHERE CLASSIFIED: ICD-10-CM

## 2024-12-19 DIAGNOSIS — E78.00 PURE HYPERCHOLESTEROLEMIA, UNSPECIFIED: ICD-10-CM

## 2024-12-19 DIAGNOSIS — D64.9 ANEMIA, UNSPECIFIED: ICD-10-CM

## 2024-12-19 DIAGNOSIS — Z00.00 ENCOUNTER FOR GENERAL ADULT MEDICAL EXAMINATION W/OUT ABNORMAL FINDINGS: ICD-10-CM

## 2024-12-19 DIAGNOSIS — R73.03 PREDIABETES.: ICD-10-CM

## 2024-12-19 DIAGNOSIS — K21.9 GASTRO-ESOPHAGEAL REFLUX DISEASE W/OUT ESOPHAGITIS: ICD-10-CM

## 2024-12-19 DIAGNOSIS — E03.9 HYPOTHYROIDISM, UNSPECIFIED: ICD-10-CM

## 2024-12-19 DIAGNOSIS — S33.8XXA SPRAIN OF OTHER PARTS OF LUMBAR SPINE AND PELVIS, INITIAL ENCOUNTER: ICD-10-CM

## 2024-12-19 LAB
T4 FREE SERPL-MCNC: 1.2 NG/DL
TSH SERPL-ACNC: 1.38 UIU/ML

## 2024-12-19 PROCEDURE — 99396 PREV VISIT EST AGE 40-64: CPT

## 2024-12-20 PROBLEM — M53.3 SACRAL PAIN: Status: ACTIVE | Noted: 2024-12-19

## 2024-12-20 PROBLEM — M53.3 SACRAL PAIN: Status: RESOLVED | Noted: 2024-12-19 | Resolved: 2024-12-19

## 2024-12-20 PROBLEM — R73.03 PREDIABETES: Status: ACTIVE | Noted: 2024-12-19

## 2024-12-20 LAB
ALBUMIN SERPL ELPH-MCNC: 4.2 G/DL
ALP BLD-CCNC: 95 U/L
ALT SERPL-CCNC: 61 U/L
ANION GAP SERPL CALC-SCNC: 11 MMOL/L
AST SERPL-CCNC: 40 U/L
BILIRUB SERPL-MCNC: 0.2 MG/DL
BUN SERPL-MCNC: 11 MG/DL
CALCIUM SERPL-MCNC: 9.2 MG/DL
CHLORIDE SERPL-SCNC: 106 MMOL/L
CHOLEST SERPL-MCNC: 241 MG/DL
CO2 SERPL-SCNC: 22 MMOL/L
CREAT SERPL-MCNC: 0.52 MG/DL
EGFR: 107 ML/MIN/1.73M2
ESTIMATED AVERAGE GLUCOSE: 114 MG/DL
GLUCOSE SERPL-MCNC: 126 MG/DL
HBA1C MFR BLD HPLC: 5.6 %
HCT VFR BLD CALC: 37 %
HDLC SERPL-MCNC: 46 MG/DL
HGB BLD-MCNC: 12.1 G/DL
LDLC SERPL CALC-MCNC: 127 MG/DL
MCHC RBC-ENTMCNC: 27.8 PG
MCHC RBC-ENTMCNC: 32.7 G/DL
MCV RBC AUTO: 84.9 FL
NONHDLC SERPL-MCNC: 195 MG/DL
PLATELET # BLD AUTO: 268 K/UL
POTASSIUM SERPL-SCNC: 3.9 MMOL/L
PROT SERPL-MCNC: 7.1 G/DL
RBC # BLD: 4.36 M/UL
RBC # FLD: 21 %
SODIUM SERPL-SCNC: 139 MMOL/L
TRIGL SERPL-MCNC: 383 MG/DL
WBC # FLD AUTO: 6.45 K/UL

## 2024-12-23 ENCOUNTER — TRANSCRIPTION ENCOUNTER (OUTPATIENT)
Age: 59
End: 2024-12-23

## 2024-12-31 ENCOUNTER — RX CHANGE (OUTPATIENT)
Age: 59
End: 2024-12-31

## 2024-12-31 ENCOUNTER — APPOINTMENT (OUTPATIENT)
Dept: FAMILY MEDICINE | Facility: CLINIC | Age: 59
End: 2024-12-31

## 2025-01-24 ENCOUNTER — APPOINTMENT (OUTPATIENT)
Dept: MAMMOGRAPHY | Facility: CLINIC | Age: 60
End: 2025-01-24
Payer: COMMERCIAL

## 2025-01-24 ENCOUNTER — APPOINTMENT (OUTPATIENT)
Dept: ULTRASOUND IMAGING | Facility: CLINIC | Age: 60
End: 2025-01-24
Payer: COMMERCIAL

## 2025-01-24 ENCOUNTER — OUTPATIENT (OUTPATIENT)
Dept: OUTPATIENT SERVICES | Facility: HOSPITAL | Age: 60
LOS: 1 days | End: 2025-01-24
Payer: COMMERCIAL

## 2025-01-24 ENCOUNTER — APPOINTMENT (OUTPATIENT)
Dept: RADIOLOGY | Facility: CLINIC | Age: 60
End: 2025-01-24
Payer: COMMERCIAL

## 2025-01-24 DIAGNOSIS — Z90.49 ACQUIRED ABSENCE OF OTHER SPECIFIED PARTS OF DIGESTIVE TRACT: Chronic | ICD-10-CM

## 2025-01-24 DIAGNOSIS — Z98.890 OTHER SPECIFIED POSTPROCEDURAL STATES: Chronic | ICD-10-CM

## 2025-01-24 DIAGNOSIS — N63.10 UNSPECIFIED LUMP IN THE RIGHT BREAST, UNSPECIFIED QUADRANT: ICD-10-CM

## 2025-01-24 PROCEDURE — 72110 X-RAY EXAM L-2 SPINE 4/>VWS: CPT | Mod: 26

## 2025-01-24 PROCEDURE — 76641 ULTRASOUND BREAST COMPLETE: CPT | Mod: 26,50

## 2025-01-24 PROCEDURE — 77067 SCR MAMMO BI INCL CAD: CPT

## 2025-01-24 PROCEDURE — 76641 ULTRASOUND BREAST COMPLETE: CPT

## 2025-01-24 PROCEDURE — 77067 SCR MAMMO BI INCL CAD: CPT | Mod: 26

## 2025-01-24 PROCEDURE — 77063 BREAST TOMOSYNTHESIS BI: CPT | Mod: 26

## 2025-01-24 PROCEDURE — 72110 X-RAY EXAM L-2 SPINE 4/>VWS: CPT

## 2025-01-24 PROCEDURE — 77063 BREAST TOMOSYNTHESIS BI: CPT

## 2025-01-29 DIAGNOSIS — M43.10 SPONDYLOLISTHESIS, SITE UNSPECIFIED: ICD-10-CM

## 2025-02-06 ENCOUNTER — TRANSCRIPTION ENCOUNTER (OUTPATIENT)
Age: 60
End: 2025-02-06

## 2025-02-06 PROBLEM — M43.10 ANTEROLISTHESIS: Status: ACTIVE | Noted: 2025-02-06

## 2025-04-07 ENCOUNTER — APPOINTMENT (OUTPATIENT)
Age: 60
End: 2025-04-07
Payer: COMMERCIAL

## 2025-04-07 DIAGNOSIS — M54.9 DORSALGIA, UNSPECIFIED: ICD-10-CM

## 2025-04-07 DIAGNOSIS — M54.2 CERVICALGIA: ICD-10-CM

## 2025-04-07 DIAGNOSIS — M25.559 PAIN IN UNSPECIFIED HIP: ICD-10-CM

## 2025-04-07 PROCEDURE — 99202 OFFICE O/P NEW SF 15 MIN: CPT

## 2025-04-10 ENCOUNTER — APPOINTMENT (OUTPATIENT)
Dept: FAMILY MEDICINE | Facility: CLINIC | Age: 60
End: 2025-04-10
Payer: COMMERCIAL

## 2025-04-10 VITALS
SYSTOLIC BLOOD PRESSURE: 111 MMHG | BODY MASS INDEX: 28.28 KG/M2 | HEART RATE: 68 BPM | HEIGHT: 66 IN | DIASTOLIC BLOOD PRESSURE: 72 MMHG | TEMPERATURE: 98.2 F | WEIGHT: 176 LBS | OXYGEN SATURATION: 98 %

## 2025-04-10 DIAGNOSIS — E78.00 PURE HYPERCHOLESTEROLEMIA, UNSPECIFIED: ICD-10-CM

## 2025-04-10 DIAGNOSIS — N39.3 STRESS INCONTINENCE (FEMALE) (MALE): ICD-10-CM

## 2025-04-10 DIAGNOSIS — M51.34 OTHER INTERVERTEBRAL DISC DEGENERATION, THORACIC REGION: ICD-10-CM

## 2025-04-10 DIAGNOSIS — K52.9 NONINFECTIVE GASTROENTERITIS AND COLITIS, UNSPECIFIED: ICD-10-CM

## 2025-04-10 DIAGNOSIS — E03.9 HYPOTHYROIDISM, UNSPECIFIED: ICD-10-CM

## 2025-04-10 DIAGNOSIS — R05.9 COUGH, UNSPECIFIED: ICD-10-CM

## 2025-04-10 DIAGNOSIS — D64.9 ANEMIA, UNSPECIFIED: ICD-10-CM

## 2025-04-10 DIAGNOSIS — K21.9 GASTRO-ESOPHAGEAL REFLUX DISEASE W/OUT ESOPHAGITIS: ICD-10-CM

## 2025-04-10 DIAGNOSIS — M51.26 OTHER INTERVERTEBRAL DISC DISPLACEMENT, LUMBAR REGION: ICD-10-CM

## 2025-04-10 DIAGNOSIS — J31.0 CHRONIC RHINITIS: ICD-10-CM

## 2025-04-10 DIAGNOSIS — R76.8 OTHER SPECIFIED ABNORMAL IMMUNOLOGICAL FINDINGS IN SERUM: ICD-10-CM

## 2025-04-10 DIAGNOSIS — J06.9 ACUTE UPPER RESPIRATORY INFECTION, UNSPECIFIED: ICD-10-CM

## 2025-04-10 PROCEDURE — 99214 OFFICE O/P EST MOD 30 MIN: CPT

## 2025-04-12 ENCOUNTER — OUTPATIENT (OUTPATIENT)
Dept: OUTPATIENT SERVICES | Facility: HOSPITAL | Age: 60
LOS: 1 days | End: 2025-04-12
Payer: COMMERCIAL

## 2025-04-12 ENCOUNTER — APPOINTMENT (OUTPATIENT)
Dept: RADIOLOGY | Facility: CLINIC | Age: 60
End: 2025-04-12
Payer: COMMERCIAL

## 2025-04-12 DIAGNOSIS — Z98.890 OTHER SPECIFIED POSTPROCEDURAL STATES: Chronic | ICD-10-CM

## 2025-04-12 DIAGNOSIS — Z90.49 ACQUIRED ABSENCE OF OTHER SPECIFIED PARTS OF DIGESTIVE TRACT: Chronic | ICD-10-CM

## 2025-04-12 DIAGNOSIS — R05.9 COUGH, UNSPECIFIED: ICD-10-CM

## 2025-04-12 PROCEDURE — 71046 X-RAY EXAM CHEST 2 VIEWS: CPT | Mod: 26

## 2025-04-12 PROCEDURE — 71046 X-RAY EXAM CHEST 2 VIEWS: CPT

## 2025-04-15 LAB
RESP PATH DNA+RNA PNL NPH NAA+NON-PROBE: NOT DETECTED
SARS-COV-2 RNA RESP QL NAA+PROBE: NOT DETECTED

## 2025-04-17 NOTE — ASU PREOP CHECKLIST - TO WHOM
Quality 47: Advance Care Plan: Advance Care Planning discussed and documented; advance care plan or surrogate decision maker documented in the medical record.
Quality 226: Preventive Care And Screening: Tobacco Use: Screening And Cessation Intervention: Patient screened for tobacco use and is an ex/non-smoker
Detail Level: Detailed
Cristela Salter

## 2025-05-16 ENCOUNTER — OUTPATIENT (OUTPATIENT)
Dept: OUTPATIENT SERVICES | Facility: HOSPITAL | Age: 60
LOS: 1 days | End: 2025-05-16

## 2025-05-16 DIAGNOSIS — Z98.890 OTHER SPECIFIED POSTPROCEDURAL STATES: Chronic | ICD-10-CM

## 2025-05-16 DIAGNOSIS — M54.2 CERVICALGIA: ICD-10-CM

## 2025-05-16 DIAGNOSIS — Z90.49 ACQUIRED ABSENCE OF OTHER SPECIFIED PARTS OF DIGESTIVE TRACT: Chronic | ICD-10-CM

## 2025-05-23 ENCOUNTER — APPOINTMENT (OUTPATIENT)
Dept: MRI IMAGING | Facility: CLINIC | Age: 60
End: 2025-05-23

## 2025-07-14 ENCOUNTER — RX RENEWAL (OUTPATIENT)
Age: 60
End: 2025-07-14
